# Patient Record
Sex: FEMALE | Race: OTHER | NOT HISPANIC OR LATINO | Employment: FULL TIME | URBAN - METROPOLITAN AREA
[De-identification: names, ages, dates, MRNs, and addresses within clinical notes are randomized per-mention and may not be internally consistent; named-entity substitution may affect disease eponyms.]

---

## 2017-11-06 ENCOUNTER — TELEPHONE (OUTPATIENT)
Dept: CARDIOLOGY | Facility: CLINIC | Age: 22
End: 2017-11-06

## 2017-11-07 NOTE — TELEPHONE ENCOUNTER
Called pt and left a message. We can see her at 3:40p on 11/16/17. Told her to call back to let me know if she could do this or not.    Kena

## 2017-11-16 ENCOUNTER — HOSPITAL ENCOUNTER (OUTPATIENT)
Dept: GENERAL RADIOLOGY | Facility: HOSPITAL | Age: 22
Discharge: HOME | End: 2017-11-16
Attending: INTERNAL MEDICINE | Admitting: INTERNAL MEDICINE

## 2017-11-16 ENCOUNTER — OFFICE VISIT (OUTPATIENT)
Dept: CARDIOLOGY | Facility: CLINIC | Age: 22
End: 2017-11-16

## 2017-11-16 VITALS
DIASTOLIC BLOOD PRESSURE: 62 MMHG | BODY MASS INDEX: 22.97 KG/M2 | SYSTOLIC BLOOD PRESSURE: 128 MMHG | HEART RATE: 70 BPM | HEIGHT: 60 IN | WEIGHT: 117 LBS

## 2017-11-16 DIAGNOSIS — R07.2 PRECORDIAL PAIN: ICD-10-CM

## 2017-11-16 DIAGNOSIS — R00.2 HEART PALPITATIONS: Primary | ICD-10-CM

## 2017-11-16 DIAGNOSIS — R00.2 HEART PALPITATIONS: ICD-10-CM

## 2017-11-16 PROCEDURE — 99204 OFFICE O/P NEW MOD 45 MIN: CPT | Performed by: INTERNAL MEDICINE

## 2017-11-16 PROCEDURE — 93000 ELECTROCARDIOGRAM COMPLETE: CPT | Performed by: INTERNAL MEDICINE

## 2017-11-16 PROCEDURE — 71020 HC CHEST PA AND LATERAL: CPT

## 2017-11-16 RX ORDER — LEVONORGESTREL / ETHINYL ESTRADIOL AND ETHINYL ESTRADIOL 150-30(84)
1 KIT ORAL DAILY
Refills: 1 | COMMUNITY
Start: 2017-09-21 | End: 2019-05-13

## 2017-11-16 NOTE — PROGRESS NOTES
Micaela Ariza  1995  Date of Office Visit: 11/16/2017  Encounter Provider: Darian Walton MD  Place of Service: [facility]      CHIEF COMPLAINT:  Chest pain  Palpitations    HISTORY OF PRESENT ILLNESS:I head the pleasure of seeing the patient in consultation today. As you well know, she is a very pleasant 22-year-old female with no significant cardiac history. She presents to me secondary to palpitations along with chest discomfort. She states that she occasionally will get chest discomfort that is typically left-sided. It varies from sharp to dull. It is mild to moderate in intensity. It typically lasts less than 10 minutes in duration. This is nonexertional. It tends to increase with deep inspiration.    She also states that she has been noted to have an abnormal heart beat by her mother. She states that she will have palpitations that are short in duration but occasionally typically once or twice a week will have episodes lasting about 10 minutes in duration and going away on their own. She denies any high-risk features including chest pain with those palpitations or syncope.        Review of Systems   Constitution: Negative for fever, weakness and malaise/fatigue.   HENT: Negative for nosebleeds and sore throat.    Eyes: Negative for blurred vision and double vision.   Cardiovascular: Positive for chest pain and palpitations. Negative for syncope.   Respiratory: Negative for cough, shortness of breath and snoring.    Endocrine: Negative for cold intolerance, heat intolerance and polydipsia.   Skin: Negative for itching, poor wound healing and rash.   Musculoskeletal: Negative for joint pain, joint swelling, muscle weakness and myalgias.   Gastrointestinal: Negative for abdominal pain, melena, nausea and vomiting.   Neurological: Negative for light-headedness, loss of balance, seizures and vertigo.   Psychiatric/Behavioral: Negative for altered mental status  "and depression.       Past Medical History:   Diagnosis Date   • Allergic rhinitis    • Helicobacter pylori ab+    • Herpes genitalis        The following portions of the patient's history were reviewed and updated as appropriate: Social history , Family history and Surgical history     No current outpatient prescriptions on file prior to visit.     No current facility-administered medications on file prior to visit.        No Known Allergies    Vitals:    11/16/17 1614   BP: 128/62   Pulse: 70   Weight: 117 lb (53.1 kg)   Height: 60\" (152.4 cm)     Physical Exam   Constitutional: She is oriented to person, place, and time. She appears well-developed and well-nourished.   HENT:   Head: Normocephalic and atraumatic.   Eyes: Conjunctivae and EOM are normal. No scleral icterus.   Neck: Normal range of motion. Neck supple. Normal carotid pulses, no hepatojugular reflux and no JVD present. Carotid bruit is not present. No tracheal deviation present. No thyromegaly present.   Cardiovascular: Normal rate and regular rhythm.  Exam reveals no gallop and no friction rub.    No murmur heard.  Pulses:       Carotid pulses are 2+ on the right side, and 2+ on the left side.       Radial pulses are 2+ on the right side, and 2+ on the left side.        Femoral pulses are 2+ on the right side, and 2+ on the left side.       Dorsalis pedis pulses are 2+ on the right side, and 2+ on the left side.        Posterior tibial pulses are 2+ on the right side, and 2+ on the left side.   Pulmonary/Chest: Breath sounds normal. No respiratory distress. She has no decreased breath sounds. She has no wheezes. She has no rhonchi. She has no rales. She exhibits no tenderness.   Abdominal: Soft. Bowel sounds are normal. She exhibits no distension. There is no tenderness. There is no rebound.   Musculoskeletal: She exhibits no edema or deformity.   Neurological: She is alert and oriented to person, place, and time. She has normal strength. No sensory " deficit.   Skin: No rash noted. No erythema.   Psychiatric: She has a normal mood and affect. Her behavior is normal.     No components found for: CBC  No results found for: CMP  No components found for: LIPID  No results found for: BMP      ECG 12 Lead  Date/Time: 11/16/2017 4:30 PM  Performed by: LUCIA STONE  Authorized by: LUCIA STONE   Comparison: not compared with previous ECG   Rhythm: sinus rhythm  Rate: normal  ST Segments: ST segments normal  T Waves: T waves normal  QRS axis: normal  Clinical impression: normal ECG               DISCUSSION/SUMMARYA 22-year-old female with no significant cardiac history who presents to me for evaluation of chest discomfort and palpitations. Her palpitations seem to be low risk. They are not occurring daily, not associated with chest pain or syncope. She has no evidence of abnormalities on her EKG.     Her chest pain is atypical. It varies from sharp to dull and tends to increase only with inspiration.     1. Chest pain: Atypical. I will get a chest x-ray and an echo. I have encouraged her to take ibuprofen if she needs to for the chest pain as it has an antiinflammatory component that is better certainly than Tylenol. If her chest x-ray and echo are negative, I would not recommend additional evaluation of the chest pain from a cardiac standpoint.   2. Palpitations: Low risk. If they become more frequent or intense, she is to give me a call and I will place a monitor on her at that time.

## 2017-11-28 ENCOUNTER — HOSPITAL ENCOUNTER (OUTPATIENT)
Dept: CARDIOLOGY | Facility: HOSPITAL | Age: 22
Discharge: HOME | End: 2017-11-28
Attending: INTERNAL MEDICINE | Admitting: INTERNAL MEDICINE

## 2017-11-28 VITALS
WEIGHT: 117 LBS | DIASTOLIC BLOOD PRESSURE: 60 MMHG | HEIGHT: 60 IN | SYSTOLIC BLOOD PRESSURE: 102 MMHG | HEART RATE: 91 BPM | BODY MASS INDEX: 22.97 KG/M2

## 2017-11-28 DIAGNOSIS — R00.2 HEART PALPITATIONS: ICD-10-CM

## 2017-11-28 DIAGNOSIS — R07.2 PRECORDIAL PAIN: ICD-10-CM

## 2017-11-28 LAB
ASCENDING AORTA: 2.1 CM
BH CV ECHO MEAS - ACS: 1.7 CM
BH CV ECHO MEAS - AO MAX PG (FULL): 3.1 MMHG
BH CV ECHO MEAS - AO MAX PG: 8 MMHG
BH CV ECHO MEAS - AO MEAN PG (FULL): 1.5 MMHG
BH CV ECHO MEAS - AO MEAN PG: 4.1 MMHG
BH CV ECHO MEAS - AO ROOT AREA (BSA CORRECTED): 1.6
BH CV ECHO MEAS - AO ROOT AREA: 4.3 CM^2
BH CV ECHO MEAS - AO ROOT DIAM: 2.3 CM
BH CV ECHO MEAS - AO V2 MAX: 141.2 CM/SEC
BH CV ECHO MEAS - AO V2 MEAN: 91.4 CM/SEC
BH CV ECHO MEAS - AO V2 VTI: 27 CM
BH CV ECHO MEAS - AVA(I,A): 1.4 CM^2
BH CV ECHO MEAS - AVA(I,D): 1.4 CM^2
BH CV ECHO MEAS - AVA(V,A): 1.5 CM^2
BH CV ECHO MEAS - AVA(V,D): 1.5 CM^2
BH CV ECHO MEAS - BSA(HAYCOCK): 1.5 M^2
BH CV ECHO MEAS - BSA: 1.5 M^2
BH CV ECHO MEAS - BZI_BMI: 22.9 KILOGRAMS/M^2
BH CV ECHO MEAS - BZI_METRIC_HEIGHT: 152.4 CM
BH CV ECHO MEAS - BZI_METRIC_WEIGHT: 53.1 KG
BH CV ECHO MEAS - CONTRAST EF (2CH): 65.1 ML/M^2
BH CV ECHO MEAS - CONTRAST EF 4CH: 64.5 ML/M^2
BH CV ECHO MEAS - EDV(MOD-SP2): 83 ML
BH CV ECHO MEAS - EDV(MOD-SP4): 76 ML
BH CV ECHO MEAS - EDV(TEICH): 87.3 ML
BH CV ECHO MEAS - EF(CUBED): 74.2 %
BH CV ECHO MEAS - EF(MOD-SP2): 65.1 %
BH CV ECHO MEAS - EF(MOD-SP4): 64.5 %
BH CV ECHO MEAS - EF(TEICH): 66.3 %
BH CV ECHO MEAS - ESV(MOD-SP2): 29 ML
BH CV ECHO MEAS - ESV(MOD-SP4): 27 ML
BH CV ECHO MEAS - ESV(TEICH): 29.4 ML
BH CV ECHO MEAS - FS: 36.4 %
BH CV ECHO MEAS - IVS/LVPW: 1.1
BH CV ECHO MEAS - IVSD: 0.8 CM
BH CV ECHO MEAS - LAT PEAK E' VEL: 16 CM/SEC
BH CV ECHO MEAS - LV DIASTOLIC VOL/BSA (35-75): 51.1 ML/M^2
BH CV ECHO MEAS - LV MASS(C)D: 103 GRAMS
BH CV ECHO MEAS - LV MASS(C)DI: 69.3 GRAMS/M^2
BH CV ECHO MEAS - LV MAX PG: 4.9 MMHG
BH CV ECHO MEAS - LV MEAN PG: 2.6 MMHG
BH CV ECHO MEAS - LV SYSTOLIC VOL/BSA (12-30): 18.2 ML/M^2
BH CV ECHO MEAS - LV V1 MAX: 110.6 CM/SEC
BH CV ECHO MEAS - LV V1 MEAN: 76.8 CM/SEC
BH CV ECHO MEAS - LV V1 VTI: 19.5 CM
BH CV ECHO MEAS - LVIDD: 4.4 CM
BH CV ECHO MEAS - LVIDS: 2.8 CM
BH CV ECHO MEAS - LVLD AP2: 7.6 CM
BH CV ECHO MEAS - LVLD AP4: 7.7 CM
BH CV ECHO MEAS - LVLS AP2: 6.2 CM
BH CV ECHO MEAS - LVLS AP4: 6.5 CM
BH CV ECHO MEAS - LVOT AREA (M): 2 CM^2
BH CV ECHO MEAS - LVOT AREA: 2 CM^2
BH CV ECHO MEAS - LVOT DIAM: 1.6 CM
BH CV ECHO MEAS - LVPWD: 0.73 CM
BH CV ECHO MEAS - MED PEAK E' VEL: 12 CM/SEC
BH CV ECHO MEAS - MV A DUR: 0.07 SEC
BH CV ECHO MEAS - MV A MAX VEL: 78.6 CM/SEC
BH CV ECHO MEAS - MV DEC SLOPE: 743.9 CM/SEC^2
BH CV ECHO MEAS - MV DEC TIME: 0.13 SEC
BH CV ECHO MEAS - MV E MAX VEL: 103.8 CM/SEC
BH CV ECHO MEAS - MV E/A: 1.3
BH CV ECHO MEAS - MV MAX PG: 8.9 MMHG
BH CV ECHO MEAS - MV MEAN PG: 2.9 MMHG
BH CV ECHO MEAS - MV P1/2T MAX VEL: 104.3 CM/SEC
BH CV ECHO MEAS - MV P1/2T: 41.1 MSEC
BH CV ECHO MEAS - MV V2 MAX: 149.3 CM/SEC
BH CV ECHO MEAS - MV V2 MEAN: 77.9 CM/SEC
BH CV ECHO MEAS - MV V2 VTI: 29.1 CM
BH CV ECHO MEAS - MVA P1/2T LCG: 2.1 CM^2
BH CV ECHO MEAS - MVA(P1/2T): 5.4 CM^2
BH CV ECHO MEAS - MVA(VTI): 1.3 CM^2
BH CV ECHO MEAS - PA ACC TIME: 0.15 SEC
BH CV ECHO MEAS - PA MAX PG (FULL): 2.3 MMHG
BH CV ECHO MEAS - PA MAX PG: 3.3 MMHG
BH CV ECHO MEAS - PA PR(ACCEL): 9.3 MMHG
BH CV ECHO MEAS - PA V2 MAX: 90.9 CM/SEC
BH CV ECHO MEAS - PULM A REVS DUR: 0.08 SEC
BH CV ECHO MEAS - PULM A REVS VEL: 21.8 CM/SEC
BH CV ECHO MEAS - PULM DIAS VEL: 74.2 CM/SEC
BH CV ECHO MEAS - PULM S/D: 0.64
BH CV ECHO MEAS - PULM SYS VEL: 47.5 CM/SEC
BH CV ECHO MEAS - PVA(V,A): 0.8 CM^2
BH CV ECHO MEAS - PVA(V,D): 0.8 CM^2
BH CV ECHO MEAS - QP/QS: 0.41
BH CV ECHO MEAS - RAP SYSTOLE: 3 MMHG
BH CV ECHO MEAS - RV MAX PG: 1 MMHG
BH CV ECHO MEAS - RV MEAN PG: 0.55 MMHG
BH CV ECHO MEAS - RV V1 MAX: 50 CM/SEC
BH CV ECHO MEAS - RV V1 MEAN: 34.5 CM/SEC
BH CV ECHO MEAS - RV V1 VTI: 10.8 CM
BH CV ECHO MEAS - RVOT AREA: 1.5 CM^2
BH CV ECHO MEAS - RVOT DIAM: 1.4 CM
BH CV ECHO MEAS - RVSP: 19.4 MMHG
BH CV ECHO MEAS - SI(AO): 78.6 ML/M^2
BH CV ECHO MEAS - SI(CUBED): 42.3 ML/M^2
BH CV ECHO MEAS - SI(LVOT): 25.8 ML/M^2
BH CV ECHO MEAS - SI(MOD-SP2): 36.3 ML/M^2
BH CV ECHO MEAS - SI(MOD-SP4): 33 ML/M^2
BH CV ECHO MEAS - SI(TEICH): 39 ML/M^2
BH CV ECHO MEAS - SUP REN AO DIAM: 0.9 CM
BH CV ECHO MEAS - SV(AO): 116.9 ML
BH CV ECHO MEAS - SV(CUBED): 62.9 ML
BH CV ECHO MEAS - SV(LVOT): 38.3 ML
BH CV ECHO MEAS - SV(MOD-SP2): 54 ML
BH CV ECHO MEAS - SV(MOD-SP4): 49 ML
BH CV ECHO MEAS - SV(RVOT): 15.7 ML
BH CV ECHO MEAS - SV(TEICH): 57.9 ML
BH CV ECHO MEAS - TAPSE (>1.6): 1.9 CM2
BH CV ECHO MEAS - TR MAX VEL: 202.2 CM/SEC
BH CV XLRA - RV BASE: 2.2 CM
BH CV XLRA - TDI S': 14 CM/SEC
E/E' RATIO: 8
LEFT ATRIUM VOLUME INDEX: 17 ML/M2
SINUS: 2.2 CM
STJ: 2.1 CM

## 2017-11-28 PROCEDURE — 93306 TTE W/DOPPLER COMPLETE: CPT | Performed by: INTERNAL MEDICINE

## 2017-11-28 PROCEDURE — 93306 TTE W/DOPPLER COMPLETE: CPT

## 2019-04-16 ENCOUNTER — TELEPHONE (OUTPATIENT)
Dept: OBSTETRICS AND GYNECOLOGY | Age: 24
End: 2019-04-16

## 2019-04-16 NOTE — TELEPHONE ENCOUNTER
Pt requesting to est care as NOB LMP 3/11/19. First pregnancy, no problems or concerns.    INS: B/C B/S no longer [facility]     Requesting Dr Barton    Sending to [place] for Dr Barton to review.     (Pt aware Dr Barton is out & on call today)

## 2019-05-13 ENCOUNTER — INITIAL PRENATAL (OUTPATIENT)
Dept: OBSTETRICS AND GYNECOLOGY | Age: 24
End: 2019-05-13

## 2019-05-13 ENCOUNTER — PROCEDURE VISIT (OUTPATIENT)
Dept: OBSTETRICS AND GYNECOLOGY | Age: 24
End: 2019-05-13

## 2019-05-13 VITALS — BODY MASS INDEX: 22.65 KG/M2 | WEIGHT: 116 LBS | DIASTOLIC BLOOD PRESSURE: 70 MMHG | SYSTOLIC BLOOD PRESSURE: 132 MMHG

## 2019-05-13 DIAGNOSIS — Z34.91 NORMAL PREGNANCY IN FIRST TRIMESTER: Primary | ICD-10-CM

## 2019-05-13 DIAGNOSIS — Z12.4 SCREENING FOR MALIGNANT NEOPLASM OF CERVIX: ICD-10-CM

## 2019-05-13 DIAGNOSIS — O36.80X0 ENCOUNTER TO DETERMINE FETAL VIABILITY OF PREGNANCY, SINGLE OR UNSPECIFIED FETUS: Primary | ICD-10-CM

## 2019-05-13 PROBLEM — Z34.01 ENCOUNTER FOR SUPERVISION OF LOW-RISK FIRST PREGNANCY IN FIRST TRIMESTER: Status: ACTIVE | Noted: 2019-05-13

## 2019-05-13 PROCEDURE — 0501F PRENATAL FLOW SHEET: CPT | Performed by: OBSTETRICS & GYNECOLOGY

## 2019-05-13 PROCEDURE — 76817 TRANSVAGINAL US OBSTETRIC: CPT | Performed by: OBSTETRICS & GYNECOLOGY

## 2019-05-13 RX ORDER — PRENATAL VIT/IRON FUM/FOLIC AC 27MG-0.8MG
TABLET ORAL 3 TIMES WEEKLY
COMMUNITY
End: 2022-06-28

## 2019-05-13 RX ORDER — DOXYLAMINE SUCCINATE AND PYRIDOXINE HYDROCHLORIDE, DELAYED RELEASE TABLETS 10 MG/10 MG 10; 10 MG/1; MG/1
2 TABLET, DELAYED RELEASE ORAL NIGHTLY PRN
Qty: 60 TABLET | Refills: 3 | Status: SHIPPED | OUTPATIENT
Start: 2019-05-13 | End: 2019-10-07

## 2019-05-13 RX ORDER — DOCUSATE SODIUM 100 MG/1
100 CAPSULE, LIQUID FILLED ORAL 2 TIMES DAILY
Qty: 60 CAPSULE | Refills: 6 | Status: SHIPPED | OUTPATIENT
Start: 2019-05-13 | End: 2022-06-28

## 2019-05-13 NOTE — PROGRESS NOTES
See scanned ultrasound report.         Ultrasound Note     2019    Patient:  Micaela Ariza      MR#:[id number]    24 y.o.   for dating and viability     Patient Active Problem List   Diagnosis   • Precordial pain   • Heart palpitations   • Encounter for supervision of low-risk first pregnancy in first trimester       [See the scanned report in the media tab for more detail]    Impression    1.  Intrauterine pregnancy at 8w0d, not consistent with LMP (9w0d)  2.  Viable fetus,   3.  EDC: Estimated Date of Delivery: 19  4.  Left ovary appears normal and measures 2.02 x 2.08 x 2.71 cm, the right ovary also appears normal and measures 1.84 x 1.15 x 0.95 cm    Relevant comparison data available:  [x] none      Becki Barton MD  2019  10:53 AM

## 2019-05-14 NOTE — PROGRESS NOTES
Chief Complaint   Patient presents with   • Initial Prenatal Visit     pregnancy confirmation LMP 3/11/19. last pap 2019       HPI:  Pt presents for initial OB visit.  This is her first pregnancy.  So far she has had some persistent nausea that has prevented her from eating a lot, but she denies any vomiting.  She is very active person, she works out frequently and eats a healthy diet.  She has become a little bit less restrictive in her diet since getting pregnant, but she does really cannot tolerate eating any meat.  She does desire genetic testing, desires the gender in an envelope    ROS:  No fever or chills, no vomiting no contractions, no leg pain, no LE edema, no leaking fluid, no bleeding, no headache, no dysuria  All other systems reviewed and negative    Exam:  See flow sheet  General:  Alert and oriented and no distress  Psych: normal affect, full range of emotion  HEENT: has significant underbite, has braces  Neck: no lymphadenopathy or thyromegaly  Lungs: CTAB, no wheezes or crackles  Heart: RRR, no M/R/G  Abd: nontender, nondistended, not apparently gravid  Ext: see flow sheet, non-tender bilateral , no lesions  Neuro: grossly normal, normal gait  Pelvic exam with normal appearing cervix, pap collected, cervix is closed      Assessment:/ PLAN:  24 y.o.  at 8w1d    Problems Addressed this Visit     None      Visit Diagnoses     Normal pregnancy in first trimester    -  Primary    Relevant Orders    Hgb. Frac. Profile    OB Panel With HIV    Urine Culture - Urine, Urine, Clean Catch    Varicella Zoster Antibody, IgG    Drug Profile Urine - 9 Drugs - Urine, Clean Catch    Screening for malignant neoplasm of cervix        Relevant Orders    IGP, Rfx Aptima HPV ASCU        Reviewed routine prenatal care with the office to include but not limited to expected weight gain during pregnancy, Tylenol products are fine, avoid aspirin and ibuprofen; Zika (travel restrictions/ok to use insect repellant); not  to change cat litter; food restrictions; avoidance of alcohol, tobacco, drugs and saunas/hot tubs.     Reviewed medical options for treatment of her nausea, also homeopathic medications.  She desires to try diclegis and sent to pharmacy today    Constipation- colace to pharmacy today and hydration    Prenatal labs and pap today    Ultrasound with viable fetus today    Reviewed genetic screening and she desires, wants the gender in an evelope    Pregnancy Risk:  NORMAL       Follow up in 4 WEEKS, 1-2 weeks genetics only    Becki Barton MD  05/13/2019  2:10 PM

## 2019-05-15 LAB
ABO GROUP BLD: (no result)
AMPHETAMINES UR QL SCN: NEGATIVE NG/ML
BACTERIA UR CULT: NO GROWTH
BACTERIA UR CULT: NORMAL
BARBITURATES UR QL SCN: NEGATIVE NG/ML
BASOPHILS # BLD AUTO: 0 X10E3/UL (ref 0–0.2)
BASOPHILS NFR BLD AUTO: 0 %
BENZODIAZ UR QL: NEGATIVE NG/ML
BLD GP AB SCN SERPL QL: NEGATIVE
BZE UR QL: NEGATIVE NG/ML
CANNABINOIDS UR QL SCN: NEGATIVE NG/ML
EOSINOPHIL # BLD AUTO: 0.1 X10E3/UL (ref 0–0.4)
EOSINOPHIL NFR BLD AUTO: 1 %
ERYTHROCYTE [DISTWIDTH] IN BLOOD BY AUTOMATED COUNT: 13.4 % (ref 12.3–15.4)
HBV SURFACE AG SERPL QL IA: NEGATIVE
HCT VFR BLD AUTO: 40.7 % (ref 34–46.6)
HCV AB S/CO SERPL IA: <0.1 S/CO RATIO (ref 0–0.9)
HGB A MFR BLD: 97.5 % (ref 96.4–98.8)
HGB A2 MFR BLD COLUMN CHROM: 2.5 % (ref 1.8–3.2)
HGB BLD-MCNC: 14 G/DL (ref 11.1–15.9)
HGB C MFR BLD: 0 %
HGB F MFR BLD: 0 % (ref 0–2)
HGB FRACT BLD-IMP: NORMAL
HGB S BLD QL SOLY: NEGATIVE
HGB S MFR BLD: 0 %
HIV 1+2 AB+HIV1 P24 AG SERPL QL IA: NON REACTIVE
IMM GRANULOCYTES # BLD AUTO: 0 X10E3/UL (ref 0–0.1)
IMM GRANULOCYTES NFR BLD AUTO: 0 %
LYMPHOCYTES # BLD AUTO: 1.7 X10E3/UL (ref 0.7–3.1)
LYMPHOCYTES NFR BLD AUTO: 19 %
MCH RBC QN AUTO: 30.6 PG (ref 26.6–33)
MCHC RBC AUTO-ENTMCNC: 34.4 G/DL (ref 31.5–35.7)
MCV RBC AUTO: 89 FL (ref 79–97)
METHADONE UR QL SCN: NEGATIVE NG/ML
MONOCYTES # BLD AUTO: 0.6 X10E3/UL (ref 0.1–0.9)
MONOCYTES NFR BLD AUTO: 6 %
NEUTROPHILS # BLD AUTO: 6.8 X10E3/UL (ref 1.4–7)
NEUTROPHILS NFR BLD AUTO: 74 %
OPIATES UR QL: NEGATIVE NG/ML
PCP UR QL: NEGATIVE NG/ML
PLATELET # BLD AUTO: 256 X10E3/UL (ref 150–379)
PROPOXYPH UR QL SCN: NEGATIVE NG/ML
RBC # BLD AUTO: 4.57 X10E6/UL (ref 3.77–5.28)
RH BLD: POSITIVE
RPR SER QL: NON REACTIVE
RUBV IGG SERPL IA-ACNC: <0.9 INDEX
VZV IGG SER IA-ACNC: 259 INDEX
WBC # BLD AUTO: 9.3 X10E3/UL (ref 3.4–10.8)

## 2019-05-16 LAB
CONV .: NORMAL
CYTOLOGIST CVX/VAG CYTO: NORMAL
CYTOLOGY CVX/VAG DOC CYTO: NORMAL
CYTOLOGY CVX/VAG DOC THIN PREP: NORMAL
DX ICD CODE: NORMAL
HIV 1 & 2 AB SER-IMP: NORMAL
Lab: NORMAL
OTHER STN SPEC: NORMAL
STAT OF ADQ CVX/VAG CYTO-IMP: NORMAL

## 2019-05-17 ENCOUNTER — TELEPHONE (OUTPATIENT)
Dept: OBSTETRICS AND GYNECOLOGY | Age: 24
End: 2019-05-17

## 2019-05-17 NOTE — TELEPHONE ENCOUNTER
----- Message from Becki Barton MD sent at 5/17/2019 10:40 AM EDT -----  Please let her know that her prenatal labs are normal but is not immune to rubella and will need vaccination after deliery. blood type O+, negative for HIV, syphilis, Hepatitis B and C. Is immune to varicella. No anemia at this time.      ----- Message -----  From: Javid, Reflab Results In  Sent: 5/15/2019   3:36 AM  To: Becki Barton MD

## 2019-06-06 ENCOUNTER — TELEPHONE (OUTPATIENT)
Dept: OBSTETRICS AND GYNECOLOGY | Age: 24
End: 2019-06-06

## 2019-06-06 DIAGNOSIS — Z31.5 ENCOUNTER FOR PROCREATIVE GENETIC COUNSELING: Primary | ICD-10-CM

## 2019-06-06 NOTE — TELEPHONE ENCOUNTER
Dr Arboleda pt had panorama testing, please sign and advise, pt DOES NOT want to know gender, will come by today to  envelope      _______________    I called her back and reviewed that the trisomy 18, 13, 21 were low risk but they were unable to report a fetal gender.  This could mean a greater risk of a sex chromosome disorder.  We will plan on referring her to maternal-fetal medicine to discuss diagnostic testing.    Becki Barton MD  6/6/2019  3:59 PM

## 2019-06-07 ENCOUNTER — TELEPHONE (OUTPATIENT)
Dept: OBSTETRICS AND GYNECOLOGY | Age: 24
End: 2019-06-07

## 2019-06-07 NOTE — TELEPHONE ENCOUNTER
----- Message from Becki Barton MD sent at 6/7/2019  3:18 PM EDT -----  Please let her know that her carrier screening for cystic fibrosis, spinal muscular atrophy, duchennes muscular dystrophy, and fragile X was negative     ----- Message -----  From: Laure Shelton APRN  Sent: 6/7/2019   3:04 PM  To: Becki Barton MD        ----- Message -----  From: Juma Hua  Sent: 6/7/2019   9:22 AM  To: FOREIGN Padilla

## 2019-06-10 ENCOUNTER — ROUTINE PRENATAL (OUTPATIENT)
Dept: OBSTETRICS AND GYNECOLOGY | Age: 24
End: 2019-06-10

## 2019-06-10 VITALS — WEIGHT: 115 LBS | SYSTOLIC BLOOD PRESSURE: 122 MMHG | BODY MASS INDEX: 22.46 KG/M2 | DIASTOLIC BLOOD PRESSURE: 74 MMHG

## 2019-06-10 DIAGNOSIS — O28.5 ABNORMAL GENETIC TEST DURING PREGNANCY: ICD-10-CM

## 2019-06-10 DIAGNOSIS — Z3A.12 12 WEEKS GESTATION OF PREGNANCY: Primary | ICD-10-CM

## 2019-06-10 DIAGNOSIS — Z34.01 ENCOUNTER FOR SUPERVISION OF LOW-RISK FIRST PREGNANCY IN FIRST TRIMESTER: ICD-10-CM

## 2019-06-10 DIAGNOSIS — K59.01 SLOW TRANSIT CONSTIPATION: ICD-10-CM

## 2019-06-10 PROBLEM — R00.2 HEART PALPITATIONS: Status: RESOLVED | Noted: 2017-11-16 | Resolved: 2019-06-10

## 2019-06-10 PROBLEM — R07.2 PRECORDIAL PAIN: Status: RESOLVED | Noted: 2017-11-16 | Resolved: 2019-06-10

## 2019-06-10 PROCEDURE — 0502F SUBSEQUENT PRENATAL CARE: CPT | Performed by: OBSTETRICS & GYNECOLOGY

## 2019-06-10 RX ORDER — AZITHROMYCIN 250 MG/1
TABLET, FILM COATED ORAL
Qty: 6 TABLET | Refills: 0 | Status: SHIPPED | OUTPATIENT
Start: 2019-06-10 | End: 2019-06-15

## 2019-06-10 NOTE — PROGRESS NOTES
Chief Complaint   Patient presents with   • Routine Prenatal Visit       HPI:  Pt presents for routine prenatal visit.  She is aware of the issue of the no call on the fetal gender.  She has an appointment on Thursday for consultation with [facility].  She is unsure if she would like diagnostic testing.  She has had ongoing issues with constipation.  This is been present prior to her pregnancy.    She has had an upper respiratory infection since our last visit 4 weeks ago.  She has an ongoing, nagging cough that she just cannot get rid of    ROS:  No fever or chills, no nausea or vomiting, no contractions, no leg pain, no LE edema, no leaking fluid, no bleeding, no headache, no dysuria  All other systems reviewed and negative    Exam:  See flow sheet  General:  Alert and oriented and no distress  Neck: no lymphadenopathy or thyromegaly  Lungs: non - labored breathing  Abd:  See flow sheet, fundus nontender  Ext: see flow sheet, non-tender bilateral , no lesions  Neuro: grossly normal    Assessment:/ PLAN:  24 y.o.  at 12w0d    Problems Addressed this Visit        Other    Encounter for supervision of low-risk first pregnancy in first trimester    Abnormal genetic test during pregnancy     No call on gender nunu blackwood, to see [facility] and have phone consultation with Alok           Other Visit Diagnoses     12 weeks gestation of pregnancy    -  Primary        Discussed that her pregnancy may be at risk of a sex chromosome abnormality due to a no call on only the sex chromosome.  Plan to refer to [facility] for further consultation and discussion of diagnostic testing.      Reviewed safe over-the-counter medications to take for her URI symptoms, also sent in azithromycin today as she has had a prolonged course of her URI and concern for possible bacterial superinfection    Pregnancy Risk:  COMPLICATED     Return in about 4 weeks (around 2019) for OB follow up.    Becki Barton MD  06/10/2019  9:26 AM

## 2019-06-13 ENCOUNTER — TRANSCRIBE ORDERS (OUTPATIENT)
Dept: ADMINISTRATIVE | Facility: HOSPITAL | Age: 24
End: 2019-06-13

## 2019-06-13 ENCOUNTER — HOSPITAL ENCOUNTER (OUTPATIENT)
Dept: ULTRASOUND IMAGING | Facility: HOSPITAL | Age: 24
Discharge: HOME | End: 2019-06-13
Admitting: OBSTETRICS & GYNECOLOGY

## 2019-06-13 ENCOUNTER — HOSPITAL ENCOUNTER (OUTPATIENT)
Dept: ULTRASOUND IMAGING | Facility: HOSPITAL | Age: 24
End: 2019-06-13

## 2019-06-13 DIAGNOSIS — Z31.5 ENCOUNTER FOR PROCREATIVE GENETIC COUNSELING: Primary | ICD-10-CM

## 2019-06-13 DIAGNOSIS — Z31.5 ENCOUNTER FOR PROCREATIVE GENETIC COUNSELING: ICD-10-CM

## 2019-06-13 PROCEDURE — 76801 OB US < 14 WKS SINGLE FETUS: CPT

## 2019-07-15 ENCOUNTER — ROUTINE PRENATAL (OUTPATIENT)
Dept: OBSTETRICS AND GYNECOLOGY | Age: 24
End: 2019-07-15

## 2019-07-15 VITALS — DIASTOLIC BLOOD PRESSURE: 70 MMHG | SYSTOLIC BLOOD PRESSURE: 112 MMHG | BODY MASS INDEX: 22.85 KG/M2 | WEIGHT: 117 LBS

## 2019-07-15 DIAGNOSIS — O28.5 ABNORMAL GENETIC TEST DURING PREGNANCY: Primary | ICD-10-CM

## 2019-07-15 DIAGNOSIS — K59.01 SLOW TRANSIT CONSTIPATION: ICD-10-CM

## 2019-07-15 DIAGNOSIS — N76.0 ACUTE VAGINITIS: ICD-10-CM

## 2019-07-15 DIAGNOSIS — Z34.01 ENCOUNTER FOR SUPERVISION OF LOW-RISK FIRST PREGNANCY IN FIRST TRIMESTER: ICD-10-CM

## 2019-07-15 PROCEDURE — 0502F SUBSEQUENT PRENATAL CARE: CPT | Performed by: OBSTETRICS & GYNECOLOGY

## 2019-07-15 RX ORDER — FLUCONAZOLE 150 MG/1
TABLET ORAL
Qty: 2 TABLET | Refills: 0 | Status: SHIPPED | OUTPATIENT
Start: 2019-07-15 | End: 2019-07-30

## 2019-07-15 NOTE — PROGRESS NOTES
Chief Complaint   Patient presents with   • Routine Prenatal Visit     pt here for routine ob check        HPI:  Pt presents for routine prenatal visit  Very constipated. Colace worked but stopped taking it  Vaginitis, present over the wkend. Tried monistat without relief, seemed to get worse.  Met with MFM, did sonogram and stated gender appeared male    ROS:  No fever or chills, no nausea or vomiting, no contractions, no leg pain, no LE edema, no leaking fluid, no bleeding, no headache, no dysuria  All other systems reviewed and negative    Exam:  See flow sheet  General:  Alert and oriented and no distress  Neck: no lymphadenopathy or thyromegaly  Lungs: non - labored breathing  Abd:  See flow sheet, fundus nontender  Ext: see flow sheet, non-tender bilateral , no lesions  Neuro: grossly normal    Assessment:/ PLAN:  24 y.o.  at 17w0d    Problems Addressed this Visit        Digestive    Slow transit constipation       Other    Encounter for supervision of low-risk first pregnancy in first trimester    Abnormal genetic test during pregnancy - Primary      Other Visit Diagnoses     Acute vaginitis        Relevant Orders    NuSwab Vaginitis (VG) - Swab, Vagina        Vaginitis- nu swab today. Also did not have GC/CT w initial OB  Gender no-call. Met w MFM and sonogram appears male. Discussed is still higher risk for sex chromosome abnormality. Panorama is not yet reporting risk of maternal sex chromosome abnormality.  AFP today  Constipation- recommmended consistent use of colace  Anatomy in two weeks    Pregnancy Risk:  NORMAL     Follow up in 2 weeks with anatomy    Becki Barton MD  07/15/2019  12:24 PM

## 2019-07-17 ENCOUNTER — TELEPHONE (OUTPATIENT)
Dept: OBSTETRICS AND GYNECOLOGY | Age: 24
End: 2019-07-17

## 2019-07-17 LAB
AFP ADJ MOM SERPL: 1.04
AFP INTERP SERPL-IMP: NORMAL
AFP INTERP SERPL-IMP: NORMAL
AFP SERPL-MCNC: 46 NG/ML
AGE AT DELIVERY: 26.6 YR
GA METHOD: NORMAL
GA: 17.1 WEEKS
IDDM PATIENT QL: NO
LABORATORY COMMENT REPORT: NORMAL
MULTIPLE PREGNANCY: NO
NEURAL TUBE DEFECT RISK FETUS: NORMAL %
RESULT: NORMAL

## 2019-07-17 NOTE — TELEPHONE ENCOUNTER
----- Message from Becki Barton MD sent at 7/17/2019 12:17 PM EDT -----  Please let her know that her AFP screen for neural tube defect was normal    ----- Message -----  From: Javid Reflab Results In  Sent: 7/17/2019   3:36 AM  To: Becki Barton MD

## 2019-07-18 ENCOUNTER — TELEPHONE (OUTPATIENT)
Dept: OBSTETRICS AND GYNECOLOGY | Age: 24
End: 2019-07-18

## 2019-07-18 LAB
A VAGINAE DNA VAG QL NAA+PROBE: ABNORMAL SCORE
BVAB2 DNA VAG QL NAA+PROBE: ABNORMAL SCORE
C ALBICANS DNA VAG QL NAA+PROBE: POSITIVE
C GLABRATA DNA VAG QL NAA+PROBE: NEGATIVE
MEGA1 DNA VAG QL NAA+PROBE: ABNORMAL SCORE
T VAGINALIS RRNA SPEC QL NAA+PROBE: NEGATIVE

## 2019-07-18 NOTE — TELEPHONE ENCOUNTER
Dr Barton pt called for her other lab results (vagina swab?) Informed pt I would send a message back to Norma since Norma called her.

## 2019-07-18 NOTE — TELEPHONE ENCOUNTER
I dont see it in my inbasket anymore, pretty sure I did sign off on it. I already gave her difucan at her visit

## 2019-07-30 ENCOUNTER — ROUTINE PRENATAL (OUTPATIENT)
Dept: OBSTETRICS AND GYNECOLOGY | Age: 24
End: 2019-07-30

## 2019-07-30 ENCOUNTER — PROCEDURE VISIT (OUTPATIENT)
Dept: OBSTETRICS AND GYNECOLOGY | Age: 24
End: 2019-07-30

## 2019-07-30 VITALS — DIASTOLIC BLOOD PRESSURE: 72 MMHG | BODY MASS INDEX: 23.24 KG/M2 | SYSTOLIC BLOOD PRESSURE: 124 MMHG | WEIGHT: 119 LBS

## 2019-07-30 DIAGNOSIS — Z36.89 ENCOUNTER FOR FETAL ANATOMIC SURVEY: Primary | ICD-10-CM

## 2019-07-30 DIAGNOSIS — Z36.86 ENCOUNTER FOR SCREENING FOR RISK OF PRE-TERM LABOR: ICD-10-CM

## 2019-07-30 DIAGNOSIS — Z34.01 ENCOUNTER FOR SUPERVISION OF LOW-RISK FIRST PREGNANCY IN FIRST TRIMESTER: ICD-10-CM

## 2019-07-30 DIAGNOSIS — K59.01 SLOW TRANSIT CONSTIPATION: Primary | ICD-10-CM

## 2019-07-30 PROCEDURE — 76817 TRANSVAGINAL US OBSTETRIC: CPT | Performed by: OBSTETRICS & GYNECOLOGY

## 2019-07-30 PROCEDURE — 76805 OB US >/= 14 WKS SNGL FETUS: CPT | Performed by: OBSTETRICS & GYNECOLOGY

## 2019-07-30 PROCEDURE — 0502F SUBSEQUENT PRENATAL CARE: CPT | Performed by: OBSTETRICS & GYNECOLOGY

## 2019-07-30 NOTE — PROGRESS NOTES
US completed NEREIDA Baldwin RN RDMS    Ultrasound Note     2019    Patient:  Micaela Ariza      MR#:[id number]    24 y.o.   for anatomic survey    Patient Active Problem List   Diagnosis   • Encounter for supervision of low-risk first pregnancy in first trimester   • Abnormal genetic test during pregnancy   • Slow transit constipation       [See the scanned report in the media tab for more details]    Impression    1.  Intrauterine pregnancy at 19w1d  2.  Normal and completed anatomic survey   3.  Fetus is male  4.  Cervical length 3.61 cm  5. Placenta is anterior, not low lying  6. Normal amniotic fluid    Relevant comparison data available:  [x] None      Becki Barton MD  2019 1:02 PM

## 2019-07-30 NOTE — PROGRESS NOTES
Chief Complaint   Patient presents with   • Routine Prenatal Visit     pt here for routine ob check and anatomy scan        HPI:  Pt presents for routine prenatal visit. Her bowel movements have been regular on the colace twice daily. Feels well. Just when she gets up  In the morning to pee has some pelvic pain.    ROS:  No fever or chills, no nausea or vomiting, no contractions, no leg pain, no LE edema, no leaking fluid, no bleeding, no headache, no dysuria  All other systems reviewed and negative    Exam:  See flow sheet  General:  Alert and oriented and no distress  Neck: no lymphadenopathy or thyromegaly  Lungs: non - labored breathing  Abd:  See flow sheet, fundus nontender  Ext: see flow sheet, non-tender bilateral , no lesions  Neuro: grossly normal    Assessment:/ PLAN:  24 y.o.  at 19w1d    Problems Addressed this Visit        Digestive    Slow transit constipation - Primary       Other    Encounter for supervision of low-risk first pregnancy in first trimester        Normal anatomy screen w cervical length today  Discussed cannot detect all possible problems with a fetus  Gender no call, male on anatomy    Constipation improved    Discussed contraception and desires monica, has had before.  Needs to pick out pediatrician    Pregnancy Risk:  NORMAL     Follow up in 4 wks    Becki Barton MD  2019  12:54 PM

## 2019-08-16 ENCOUNTER — TELEPHONE (OUTPATIENT)
Dept: OBSTETRICS AND GYNECOLOGY | Age: 24
End: 2019-08-16

## 2019-08-17 ENCOUNTER — HOSPITAL ENCOUNTER (OUTPATIENT)
Facility: HOSPITAL | Age: 24
Setting detail: OBSERVATION
Discharge: HOME | End: 2019-08-17
Attending: OBSTETRICS & GYNECOLOGY | Admitting: OBSTETRICS & GYNECOLOGY

## 2019-08-17 VITALS
RESPIRATION RATE: 18 BRPM | HEART RATE: 78 BPM | SYSTOLIC BLOOD PRESSURE: 134 MMHG | TEMPERATURE: 98.7 F | DIASTOLIC BLOOD PRESSURE: 77 MMHG

## 2019-08-17 PROBLEM — Z34.90 PREGNANCY: Status: ACTIVE | Noted: 2019-08-17

## 2019-08-17 PROCEDURE — G0378 HOSPITAL OBSERVATION PER HR: HCPCS

## 2019-08-17 NOTE — NURSING NOTE
Phone call report to Dr. Hazel. Pt presents with c/o decreased FM x 2-3 days. States she's been feeling fetal movement since 13 weeks. Continuous audible fetal movement noted. No contractions. MD reviewed chart while on phone and states last U/S report shows anterior placenta.

## 2019-08-27 ENCOUNTER — ROUTINE PRENATAL (OUTPATIENT)
Dept: OBSTETRICS AND GYNECOLOGY | Age: 24
End: 2019-08-27

## 2019-08-27 VITALS — WEIGHT: 126.2 LBS | SYSTOLIC BLOOD PRESSURE: 110 MMHG | DIASTOLIC BLOOD PRESSURE: 60 MMHG | BODY MASS INDEX: 24.65 KG/M2

## 2019-08-27 DIAGNOSIS — Z34.01 ENCOUNTER FOR SUPERVISION OF LOW-RISK FIRST PREGNANCY IN FIRST TRIMESTER: Primary | ICD-10-CM

## 2019-08-27 PROCEDURE — 0502F SUBSEQUENT PRENATAL CARE: CPT | Performed by: OBSTETRICS & GYNECOLOGY

## 2019-08-27 NOTE — PROGRESS NOTES
Chief Complaint   Patient presents with   • Routine Prenatal Visit     pt here for routine ob check        HPI:  Pt presents for routine prenatal visit. Went to the hospital w dec FM and was normal, does have ant placenta and is early. Feeling normal FM today.    ROS:  No fever or chills, no nausea or vomiting, no contractions, no leg pain, no LE edema, no leaking fluid, no bleeding, no headache, no dysuria  All other systems reviewed and negative    Exam:  See flow sheet  General:  Alert and oriented and no distress  Neck: no lymphadenopathy or thyromegaly  Lungs: non - labored breathing  Abd:  See flow sheet, fundus nontender  Ext: see flow sheet, non-tender bilateral , no lesions  Neuro: grossly normal    Assessment:/ PLAN:  24 y.o.  at 23w1d    Plan for GCT etc next visit  Constipation resolved  Reviewed fetal movement    Pregnancy Risk:  NORMAL     Follow up in 4 wks    Becki Barton MD  2019  2:56 PM

## 2019-09-23 ENCOUNTER — ROUTINE PRENATAL (OUTPATIENT)
Dept: OBSTETRICS AND GYNECOLOGY | Age: 24
End: 2019-09-23

## 2019-09-23 VITALS — WEIGHT: 127.4 LBS | DIASTOLIC BLOOD PRESSURE: 70 MMHG | SYSTOLIC BLOOD PRESSURE: 120 MMHG | BODY MASS INDEX: 24.88 KG/M2

## 2019-09-23 DIAGNOSIS — Z34.82 ENCOUNTER FOR SUPERVISION OF OTHER NORMAL PREGNANCY IN SECOND TRIMESTER: Primary | ICD-10-CM

## 2019-09-23 DIAGNOSIS — Z34.03 ENCOUNTER FOR SUPERVISION OF LOW-RISK FIRST PREGNANCY IN THIRD TRIMESTER: ICD-10-CM

## 2019-09-23 DIAGNOSIS — Z3A.27 27 WEEKS GESTATION OF PREGNANCY: ICD-10-CM

## 2019-09-23 PROCEDURE — 90471 IMMUNIZATION ADMIN: CPT | Performed by: OBSTETRICS & GYNECOLOGY

## 2019-09-23 PROCEDURE — 90715 TDAP VACCINE 7 YRS/> IM: CPT | Performed by: OBSTETRICS & GYNECOLOGY

## 2019-09-23 PROCEDURE — 0502F SUBSEQUENT PRENATAL CARE: CPT | Performed by: OBSTETRICS & GYNECOLOGY

## 2019-09-23 NOTE — PROGRESS NOTES
Chief Complaint   Patient presents with   • Routine Prenatal Visit     pt here for routine ob check, GTT, and Tdap vaccine        HPI:  Pt presents for routine prenatal visit. Feels like she is already running out of room. Concerned about weight gain, is not gaining much weight though she has been eating a non-low-carb diet.     ROS:  No fever or chills, no nausea or vomiting, no contractions, no leg pain, no LE edema, no leaking fluid, no bleeding, no headache, no dysuria  All other systems reviewed and negative    Exam:  See flow sheet  General:  Alert and oriented and no distress  Neck: no lymphadenopathy or thyromegaly  Lungs: non - labored breathing  Abd:  See flow sheet, fundus nontender  Ext: see flow sheet, non-tender bilateral , no lesions  Neuro: grossly normal    Assessment:/ PLAN:  24 y.o.  at 27w0d    Discussed her weight gain is normal so far at least 10 lbs and would expect approx 20 lb wt gain for her at end of pregnancy  Interested in 39 wk IOL due to social reasons, so that her mom can be present. Discussed will determine if appropriate closer to the time. Reviewed ARRIVE trial    GCT, CBC, HIV, RPR and Tdap today  Recommended close family members and caregivers have an adult booster of tdap  Breast pump order form completed today     Pregnancy Risk:  NORMAL     Follow up in 2 weeks    Becki Barton MD  2019  10:28 AM

## 2019-09-24 ENCOUNTER — TELEPHONE (OUTPATIENT)
Dept: OBSTETRICS AND GYNECOLOGY | Age: 24
End: 2019-09-24

## 2019-09-24 DIAGNOSIS — O99.810 ABNORMAL GLUCOSE TOLERANCE AFFECTING PREGNANCY, ANTEPARTUM: Primary | ICD-10-CM

## 2019-09-24 LAB
BASOPHILS # BLD AUTO: 0.03 10*3/MM3 (ref 0–0.2)
BASOPHILS NFR BLD AUTO: 0.3 % (ref 0–1.5)
EOSINOPHIL # BLD AUTO: 0.11 10*3/MM3 (ref 0–0.4)
EOSINOPHIL NFR BLD AUTO: 1.1 % (ref 0.3–6.2)
ERYTHROCYTE [DISTWIDTH] IN BLOOD BY AUTOMATED COUNT: 11.9 % (ref 12.3–15.4)
GLUCOSE 1H P 50 G GLC PO SERPL-MCNC: 136 MG/DL (ref 65–139)
HCT VFR BLD AUTO: 36 % (ref 34–46.6)
HGB BLD-MCNC: 12.1 G/DL (ref 12–15.9)
HIV 1+2 AB+HIV1 P24 AG SERPL QL IA: NON REACTIVE
IMM GRANULOCYTES # BLD AUTO: 0.05 10*3/MM3 (ref 0–0.05)
IMM GRANULOCYTES NFR BLD AUTO: 0.5 % (ref 0–0.5)
LYMPHOCYTES # BLD AUTO: 1.5 10*3/MM3 (ref 0.7–3.1)
LYMPHOCYTES NFR BLD AUTO: 14.7 % (ref 19.6–45.3)
MCH RBC QN AUTO: 30.9 PG (ref 26.6–33)
MCHC RBC AUTO-ENTMCNC: 33.6 G/DL (ref 31.5–35.7)
MCV RBC AUTO: 92.1 FL (ref 79–97)
MONOCYTES # BLD AUTO: 0.6 10*3/MM3 (ref 0.1–0.9)
MONOCYTES NFR BLD AUTO: 5.9 % (ref 5–12)
NEUTROPHILS # BLD AUTO: 7.94 10*3/MM3 (ref 1.7–7)
NEUTROPHILS NFR BLD AUTO: 77.5 % (ref 42.7–76)
NRBC BLD AUTO-RTO: 0 /100 WBC (ref 0–0.2)
PLATELET # BLD AUTO: 221 10*3/MM3 (ref 140–450)
RBC # BLD AUTO: 3.91 10*6/MM3 (ref 3.77–5.28)
RPR SER QL: NORMAL
WBC # BLD AUTO: 10.23 10*3/MM3 (ref 3.4–10.8)

## 2019-09-24 NOTE — TELEPHONE ENCOUNTER
Pt notified and understands. Will come in within the next few days to do the 3 hr GTT. She is aware that she needs to be fasting

## 2019-09-24 NOTE — TELEPHONE ENCOUNTER
----- Message from Becki Barton MD sent at 9/24/2019 12:56 PM EDT -----  Please let her know that her glucose test was a little bit elevated, I use a cutoff of 135 and her glucose challenge was 136.  Recommend doing the 3-hour test, needs to come in fasting for this. Ordered in future labs.    ----- Message -----  From: Interface, Reflab Results In  Sent: 9/24/2019   5:35 AM  To: Becki Barton MD

## 2019-09-27 ENCOUNTER — TELEPHONE (OUTPATIENT)
Dept: OBSTETRICS AND GYNECOLOGY | Age: 24
End: 2019-09-27

## 2019-09-27 LAB
GLUCOSE 1H P 100 G GLC PO SERPL-MCNC: 103 MG/DL (ref 65–179)
GLUCOSE 2H P 100 G GLC PO SERPL-MCNC: 115 MG/DL (ref 65–154)
GLUCOSE 3H P 100 G GLC PO SERPL-MCNC: 100 MG/DL (ref 65–139)
GLUCOSE P FAST SERPL-MCNC: 73 MG/DL (ref 65–94)

## 2019-09-27 NOTE — TELEPHONE ENCOUNTER
----- Message from Becki Barton MD sent at 9/27/2019  2:26 PM EDT -----  Please let her know that her three hour glucose test was normal.    ----- Message -----  From: Javid, Reflab Results In  Sent: 9/27/2019   3:36 AM  To: Becki Barton MD

## 2019-09-29 ENCOUNTER — RESULTS ENCOUNTER (OUTPATIENT)
Dept: OBSTETRICS AND GYNECOLOGY | Age: 24
End: 2019-09-29

## 2019-09-29 DIAGNOSIS — O99.810 ABNORMAL GLUCOSE TOLERANCE AFFECTING PREGNANCY, ANTEPARTUM: ICD-10-CM

## 2019-09-30 ENCOUNTER — TELEPHONE (OUTPATIENT)
Dept: OBSTETRICS AND GYNECOLOGY | Age: 24
End: 2019-09-30

## 2019-09-30 ENCOUNTER — ROUTINE PRENATAL (OUTPATIENT)
Dept: OBSTETRICS AND GYNECOLOGY | Age: 24
End: 2019-09-30

## 2019-09-30 VITALS — BODY MASS INDEX: 24.92 KG/M2 | DIASTOLIC BLOOD PRESSURE: 82 MMHG | WEIGHT: 127.6 LBS | SYSTOLIC BLOOD PRESSURE: 132 MMHG

## 2019-09-30 DIAGNOSIS — B37.31 VULVOVAGINAL CANDIDIASIS: ICD-10-CM

## 2019-09-30 DIAGNOSIS — Z34.03 ENCOUNTER FOR SUPERVISION OF LOW-RISK FIRST PREGNANCY IN THIRD TRIMESTER: ICD-10-CM

## 2019-09-30 DIAGNOSIS — Z13.89 SCREENING FOR BLOOD OR PROTEIN IN URINE: Primary | ICD-10-CM

## 2019-09-30 DIAGNOSIS — R82.998 LEUKOCYTES IN URINE: ICD-10-CM

## 2019-09-30 LAB
BILIRUB BLD-MCNC: NEGATIVE MG/DL
BILIRUB BLD-MCNC: NEGATIVE MG/DL
CLARITY, POC: ABNORMAL
CLARITY, POC: CLEAR
COLOR UR: YELLOW
COLOR UR: YELLOW
GLUCOSE UR STRIP-MCNC: NEGATIVE MG/DL
GLUCOSE UR STRIP-MCNC: NEGATIVE MG/DL
KETONES UR QL: NEGATIVE
KETONES UR QL: NEGATIVE
LEUKOCYTE EST, POC: ABNORMAL
LEUKOCYTE EST, POC: ABNORMAL
NITRITE UR-MCNC: NEGATIVE MG/ML
NITRITE UR-MCNC: NEGATIVE MG/ML
PH UR: 6.5 [PH] (ref 5–8)
PH UR: 7 [PH] (ref 5–8)
PROT UR STRIP-MCNC: NEGATIVE MG/DL
PROT UR STRIP-MCNC: NEGATIVE MG/DL
RBC # UR STRIP: ABNORMAL /UL
RBC # UR STRIP: NEGATIVE /UL
SP GR UR: 1.01 (ref 1–1.03)
SP GR UR: 1.01 (ref 1–1.03)
UROBILINOGEN UR QL: NORMAL
UROBILINOGEN UR QL: NORMAL

## 2019-09-30 PROCEDURE — 81002 URINALYSIS NONAUTO W/O SCOPE: CPT | Performed by: OBSTETRICS & GYNECOLOGY

## 2019-09-30 PROCEDURE — 0502F SUBSEQUENT PRENATAL CARE: CPT | Performed by: OBSTETRICS & GYNECOLOGY

## 2019-09-30 RX ORDER — FLUCONAZOLE 150 MG/1
150 TABLET ORAL ONCE
Qty: 2 TABLET | Refills: 0 | Status: SHIPPED | OUTPATIENT
Start: 2019-09-30 | End: 2019-09-30

## 2019-09-30 RX ORDER — NITROFURANTOIN 25; 75 MG/1; MG/1
100 CAPSULE ORAL 2 TIMES DAILY
Qty: 14 CAPSULE | Refills: 0 | Status: SHIPPED | OUTPATIENT
Start: 2019-09-30 | End: 2019-10-07

## 2019-09-30 NOTE — PROGRESS NOTES
"Chief Complaint   Patient presents with   • Pregnancy Problem     pt c/o yellow discharge. went to immediate care center over the weekend and was told her WBC count was high.        HPI:  Pt presents for routine prenatal visit. Friday morning and another morning this weekend had pelvic pain when woke up in AM. Also her discharge was an \"off\" color, more of a yellow color but has no itching, burning.   Seems like when she has had BV in the past   Has had unprotected intercourse with her baby's father, she is not with him and he has other partners    Diarrhea for two days, has some nausea but no vomiting. Has had a few people at work who have been sick.    ROS:  No fever or chills, no contractions, no leg pain, no LE edema, no leaking fluid, no bleeding, no headache, no dysuria  All other systems reviewed and negative    Exam:  See flow sheet  General:  Alert and oriented and no distress  Neck: no lymphadenopathy or thyromegaly  Lungs: non - labored breathing  Abd:  See flow sheet, fundus nontender  Ext: see flow sheet, non-tender bilateral , no lesions  Neuro: grossly normal    Pelvic exam with greenish tinged chunky white discharge    Wet mount + for hyphae but also some WBC    Assessment:/ PLAN:  24 y.o.  at 28w0d    Candidiasis today and diflucan to pharmacy  Also large leuks on UA and macrobid to pharmacy. Will send culture  Also concern for exposure, STI panel send today    Pregnancy Risk:  NORMAL     Follow up as scheduled    Becki Barton MD  2019  12:37 PM        "

## 2019-10-03 ENCOUNTER — TELEPHONE (OUTPATIENT)
Dept: OBSTETRICS AND GYNECOLOGY | Age: 24
End: 2019-10-03

## 2019-10-03 LAB
A VAGINAE DNA VAG QL NAA+PROBE: ABNORMAL SCORE
BVAB2 DNA VAG QL NAA+PROBE: ABNORMAL SCORE
C ALBICANS DNA VAG QL NAA+PROBE: POSITIVE
C GLABRATA DNA VAG QL NAA+PROBE: NEGATIVE
C TRACH RRNA SPEC QL NAA+PROBE: NEGATIVE
MEGA1 DNA VAG QL NAA+PROBE: ABNORMAL SCORE
N GONORRHOEA RRNA SPEC QL NAA+PROBE: NEGATIVE
T VAGINALIS RRNA SPEC QL NAA+PROBE: NEGATIVE

## 2019-10-03 NOTE — TELEPHONE ENCOUNTER
----- Message from Becki Barton MD sent at 10/3/2019 11:29 AM EDT -----  The swab showed yeast, as we suspected, no other infection. Hopefully she is feeling better    ----- Message -----  From: Norma Lal MA  Sent: 9/30/2019  12:03 PM  To: Becki Barton MD

## 2019-10-07 ENCOUNTER — ROUTINE PRENATAL (OUTPATIENT)
Dept: OBSTETRICS AND GYNECOLOGY | Age: 24
End: 2019-10-07

## 2019-10-07 VITALS — WEIGHT: 130.8 LBS | DIASTOLIC BLOOD PRESSURE: 70 MMHG | SYSTOLIC BLOOD PRESSURE: 128 MMHG | BODY MASS INDEX: 25.55 KG/M2

## 2019-10-07 DIAGNOSIS — Z23 NEED FOR INFLUENZA VACCINATION: Primary | ICD-10-CM

## 2019-10-07 PROCEDURE — 0502F SUBSEQUENT PRENATAL CARE: CPT | Performed by: OBSTETRICS & GYNECOLOGY

## 2019-10-07 PROCEDURE — 90471 IMMUNIZATION ADMIN: CPT | Performed by: OBSTETRICS & GYNECOLOGY

## 2019-10-07 PROCEDURE — 90674 CCIIV4 VAC NO PRSV 0.5 ML IM: CPT | Performed by: OBSTETRICS & GYNECOLOGY

## 2019-10-07 NOTE — PROGRESS NOTES
Chief Complaint   Patient presents with   • Routine Prenatal Visit     pt here for routine ob check

## 2019-10-22 ENCOUNTER — PROCEDURE VISIT (OUTPATIENT)
Dept: OBSTETRICS AND GYNECOLOGY | Age: 24
End: 2019-10-22

## 2019-10-22 ENCOUNTER — ROUTINE PRENATAL (OUTPATIENT)
Dept: OBSTETRICS AND GYNECOLOGY | Age: 24
End: 2019-10-22

## 2019-10-22 VITALS — SYSTOLIC BLOOD PRESSURE: 114 MMHG | DIASTOLIC BLOOD PRESSURE: 64 MMHG | BODY MASS INDEX: 26.37 KG/M2 | WEIGHT: 135 LBS

## 2019-10-22 DIAGNOSIS — O41.03X0 OLIGOHYDRAMNIOS IN THIRD TRIMESTER, SINGLE OR UNSPECIFIED FETUS: Primary | ICD-10-CM

## 2019-10-22 DIAGNOSIS — Z3A.31 31 WEEKS GESTATION OF PREGNANCY: Primary | ICD-10-CM

## 2019-10-22 DIAGNOSIS — B37.31 VULVOVAGINAL CANDIDIASIS: ICD-10-CM

## 2019-10-22 PROCEDURE — 0502F SUBSEQUENT PRENATAL CARE: CPT | Performed by: OBSTETRICS & GYNECOLOGY

## 2019-10-22 PROCEDURE — 76815 OB US LIMITED FETUS(S): CPT | Performed by: OBSTETRICS & GYNECOLOGY

## 2019-10-22 PROCEDURE — 87210 SMEAR WET MOUNT SALINE/INK: CPT | Performed by: OBSTETRICS & GYNECOLOGY

## 2019-10-22 NOTE — PROGRESS NOTES
Us done    Sonogram for NANCIE only  NANCIE 10.26 cm, normal, and DVP 10.26 cm  Vertex presentation and anterior placenta    Becki Bartno MD  10/22/2019  5:47 PM

## 2019-10-22 NOTE — PROGRESS NOTES
Chief Complaint   Patient presents with   • Routine Prenatal Visit     pt here for routine ob check      She complains that she wakes up at night often due to feeling of wetness, watery discharge. She denies any gushes of fluid or leaking during the day.  She notes some general uterine discomfort, worse with movement.  She denies any vaginal bleeding, does not feel as though she is having contractions.    She states she desires NCB, has not attended any classes or learned about coping mechanisms    /64   Wt 61.2 kg (135 lb)   LMP 2019   BMI 26.37 kg/m²   Appears well and is in no distress today  Regular, nonlabored breathing  Speculum exam with no pooling, no ferning, does have a slightly chunky discharge    Negative nitrazine, negative ferning  Wet mount with normal epithelial cells, no hyphae, no white blood cells    A/P  Exam negative for rupture today, discussed that she likely just has an increased physiologic discharge of pregnancy  Reviewed  labor precautions, cervix is closed today   S/p flu shot    Recommended she attend classes about natural child birth to learn about coping mechanisms    Fu 2 wks    Becki Barton MD  10/22/2019  4:06 PM

## 2019-11-04 ENCOUNTER — ROUTINE PRENATAL (OUTPATIENT)
Dept: OBSTETRICS AND GYNECOLOGY | Age: 24
End: 2019-11-04

## 2019-11-04 VITALS — BODY MASS INDEX: 26.87 KG/M2 | SYSTOLIC BLOOD PRESSURE: 120 MMHG | WEIGHT: 137.6 LBS | DIASTOLIC BLOOD PRESSURE: 70 MMHG

## 2019-11-04 DIAGNOSIS — B00.9 HERPES SIMPLEX VIRUS TYPE 2 (HSV-2) INFECTION AFFECTING PREGNANCY IN THIRD TRIMESTER: Primary | ICD-10-CM

## 2019-11-04 DIAGNOSIS — O98.513 HERPES SIMPLEX VIRUS TYPE 2 (HSV-2) INFECTION AFFECTING PREGNANCY IN THIRD TRIMESTER: Primary | ICD-10-CM

## 2019-11-04 PROBLEM — R76.8 HELICOBACTER PYLORI AB+: Status: ACTIVE | Noted: 2019-11-04

## 2019-11-04 PROCEDURE — 0502F SUBSEQUENT PRENATAL CARE: CPT | Performed by: OBSTETRICS & GYNECOLOGY

## 2019-11-04 RX ORDER — GUAIFENESIN 600 MG/1
600 TABLET, EXTENDED RELEASE ORAL 2 TIMES DAILY
Status: ON HOLD | COMMUNITY
End: 2019-12-11

## 2019-11-04 RX ORDER — VALACYCLOVIR HYDROCHLORIDE 500 MG/1
500 TABLET, FILM COATED ORAL 2 TIMES DAILY
Qty: 60 TABLET | Refills: 2 | Status: SHIPPED | OUTPATIENT
Start: 2019-11-04 | End: 2019-12-14 | Stop reason: HOSPADM

## 2019-11-04 NOTE — PROGRESS NOTES
Chief Complaint   Patient presents with   • Routine Prenatal Visit     33w0d       HPI:  Pt presents for routine prenatal visit. Has URI mostly w nasal congestion and watery eyes.    ROS:  No fever or chills, no nausea or vomiting, no contractions, no leg pain, no LE edema, no leaking fluid, no bleeding, no headache, no dysuria  All other systems reviewed and negative    Exam:  See flow sheet  General:  Alert and oriented and no distress  Neck: no lymphadenopathy or thyromegaly  Lungs: non - labored breathing  Abd:  See flow sheet, fundus nontender  Ext: see flow sheet, non-tender bilateral , no lesions  Neuro: grossly normal    Assessment:/ PLAN:  24 y.o.  at 33w0d    Reviewed medications safe to use in pregnancy for URI symptoms  S/p flu shot  Discussed finding a pediatrician today  Also discussed NCB  Has a hx of HSV, has not had outbreak in over a year. HSV suppression to pharmacy today, discussed can start now or at next visit.    Pregnancy Risk:  NORMAL     Follow up in 2 wks    Becki Barton MD  2019  11:05 AM

## 2019-11-18 ENCOUNTER — ROUTINE PRENATAL (OUTPATIENT)
Dept: OBSTETRICS AND GYNECOLOGY | Age: 24
End: 2019-11-18

## 2019-11-18 ENCOUNTER — PROCEDURE VISIT (OUTPATIENT)
Dept: OBSTETRICS AND GYNECOLOGY | Age: 24
End: 2019-11-18

## 2019-11-18 VITALS — WEIGHT: 139 LBS | BODY MASS INDEX: 27.15 KG/M2 | SYSTOLIC BLOOD PRESSURE: 130 MMHG | DIASTOLIC BLOOD PRESSURE: 80 MMHG

## 2019-11-18 DIAGNOSIS — B00.9 HERPES SIMPLEX VIRUS TYPE 2 (HSV-2) INFECTION AFFECTING PREGNANCY IN THIRD TRIMESTER: ICD-10-CM

## 2019-11-18 DIAGNOSIS — O36.5939 SGA (SMALL FOR GESTATIONAL AGE), FETAL, AFFECTING CARE OF MOTHER, ANTEPARTUM, THIRD TRIMESTER, OTHER FETUS: Primary | ICD-10-CM

## 2019-11-18 DIAGNOSIS — O26.849 FETAL SIZE INCONSISTENT WITH DATES: Primary | ICD-10-CM

## 2019-11-18 DIAGNOSIS — O98.513 HERPES SIMPLEX VIRUS TYPE 2 (HSV-2) INFECTION AFFECTING PREGNANCY IN THIRD TRIMESTER: ICD-10-CM

## 2019-11-18 PROBLEM — B37.31 VULVOVAGINAL CANDIDIASIS: Status: RESOLVED | Noted: 2019-09-30 | Resolved: 2019-11-18

## 2019-11-18 PROCEDURE — 0502F SUBSEQUENT PRENATAL CARE: CPT | Performed by: OBSTETRICS & GYNECOLOGY

## 2019-11-18 PROCEDURE — 76816 OB US FOLLOW-UP PER FETUS: CPT | Performed by: OBSTETRICS & GYNECOLOGY

## 2019-11-18 NOTE — PROGRESS NOTES
Chief Complaint   Patient presents with   • Routine Prenatal Visit     35w0d       HPI:  Pt presents for routine prenatal visit. She is doing well, no complaints, did start the acyclovir    ROS:  No fever or chills, no nausea or vomiting, no contractions, no leg pain, no LE edema, no leaking fluid, no bleeding, no headache, no dysuria  All other systems reviewed and negative    Exam:  See flow sheet  General:  Alert and oriented and no distress  Neck: no lymphadenopathy or thyromegaly  Lungs: non - labored breathing  Abd:  See flow sheet, fundus nontender  Ext: see flow sheet, non-tender bilateral , no lesions  Neuro: grossly normal    Assessment:/ PLAN:  24 y.o.  at 35w0d    FH small today, growth wnl however at 39% overall and AC 50%  Vertex  GBS next visit    Pregnancy Risk:  NORMAL     Follow up in 1-2 weeks    Becki Barton MD  2019  3:27 PM

## 2019-11-25 ENCOUNTER — ROUTINE PRENATAL (OUTPATIENT)
Dept: OBSTETRICS AND GYNECOLOGY | Age: 24
End: 2019-11-25

## 2019-11-25 VITALS — BODY MASS INDEX: 27.38 KG/M2 | SYSTOLIC BLOOD PRESSURE: 110 MMHG | WEIGHT: 140.2 LBS | DIASTOLIC BLOOD PRESSURE: 70 MMHG

## 2019-11-25 DIAGNOSIS — Z36.85 ANTENATAL SCREENING FOR STREPTOCOCCUS B: ICD-10-CM

## 2019-11-25 DIAGNOSIS — Z34.03 ENCOUNTER FOR SUPERVISION OF LOW-RISK FIRST PREGNANCY IN THIRD TRIMESTER: ICD-10-CM

## 2019-11-25 DIAGNOSIS — O98.513 HERPES SIMPLEX VIRUS TYPE 2 (HSV-2) INFECTION AFFECTING PREGNANCY IN THIRD TRIMESTER: Primary | ICD-10-CM

## 2019-11-25 DIAGNOSIS — B00.9 HERPES SIMPLEX VIRUS TYPE 2 (HSV-2) INFECTION AFFECTING PREGNANCY IN THIRD TRIMESTER: Primary | ICD-10-CM

## 2019-11-25 PROCEDURE — 0502F SUBSEQUENT PRENATAL CARE: CPT | Performed by: OBSTETRICS & GYNECOLOGY

## 2019-11-25 NOTE — PROGRESS NOTES
Chief Complaint   Patient presents with   • Routine Prenatal Visit     36w0d, GBS today      Fidelina more frequently but not regularly  Normal FM  No LOF    Attempted cervical exam but too uncomfortable  GBS today  vtx on BSUS    noraml growth last visit    FU 1 wk    Becki Barton MD  11/25/2019  2:58 PM

## 2019-11-29 LAB — B-HEM STREP SPEC QL CULT: NEGATIVE

## 2019-12-02 ENCOUNTER — ROUTINE PRENATAL (OUTPATIENT)
Dept: OBSTETRICS AND GYNECOLOGY | Age: 24
End: 2019-12-02

## 2019-12-02 ENCOUNTER — TELEPHONE (OUTPATIENT)
Dept: OBSTETRICS AND GYNECOLOGY | Age: 24
End: 2019-12-02

## 2019-12-02 VITALS — BODY MASS INDEX: 27.38 KG/M2 | SYSTOLIC BLOOD PRESSURE: 128 MMHG | WEIGHT: 140.2 LBS | DIASTOLIC BLOOD PRESSURE: 78 MMHG

## 2019-12-02 DIAGNOSIS — Z34.03 ENCOUNTER FOR SUPERVISION OF LOW-RISK FIRST PREGNANCY IN THIRD TRIMESTER: Primary | ICD-10-CM

## 2019-12-02 PROCEDURE — 0502F SUBSEQUENT PRENATAL CARE: CPT | Performed by: OBSTETRICS & GYNECOLOGY

## 2019-12-02 NOTE — TELEPHONE ENCOUNTER
----- Message from Becki Barton MD sent at 12/2/2019  9:44 AM EST -----  Please let her know that her GBS swab was negative.    ----- Message -----  From: Javid, Reflab Results In  Sent: 11/29/2019   9:36 AM  To: Becki Barton MD

## 2019-12-02 NOTE — PROGRESS NOTES
Chief Complaint   Patient presents with   • Routine Prenatal Visit     37w0d       HPI:  Pt presents for routine prenatal visit  Still desires IOL 39 wks for social reasons  Having irregular contractions, not suspicious for labor    ROS:  No fever or chills, no nausea or vomiting, no leg pain, no LE edema, no leaking fluid, no bleeding, no headache, no dysuria  All other systems reviewed and negative    Exam:  See flow sheet  General:  Alert and oriented and no distress  Neck: no lymphadenopathy or thyromegaly  Lungs: non - labored breathing  Abd:  See flow sheet, fundus nontender  Ext: see flow sheet, non-tender bilateral , no lesions  Neuro: grossly normal    Assessment:/ PLAN:  24 y.o.  at 37w0d    Reviewed IOL today  Reviewed signs of labor    Pregnancy Risk:  NORMAL       Follow up in 1 wk    Becki Barton MD  2019  2:31 PM

## 2019-12-09 ENCOUNTER — ROUTINE PRENATAL (OUTPATIENT)
Dept: OBSTETRICS AND GYNECOLOGY | Age: 24
End: 2019-12-09

## 2019-12-09 VITALS — WEIGHT: 140.4 LBS | DIASTOLIC BLOOD PRESSURE: 72 MMHG | SYSTOLIC BLOOD PRESSURE: 122 MMHG | BODY MASS INDEX: 27.42 KG/M2

## 2019-12-09 DIAGNOSIS — Z34.03 ENCOUNTER FOR SUPERVISION OF LOW-RISK FIRST PREGNANCY IN THIRD TRIMESTER: ICD-10-CM

## 2019-12-09 DIAGNOSIS — Z13.89 SCREENING FOR HEMATURIA OR PROTEINURIA: Primary | ICD-10-CM

## 2019-12-09 LAB
GLUCOSE UR STRIP-MCNC: NEGATIVE MG/DL
PROT UR STRIP-MCNC: NEGATIVE MG/DL

## 2019-12-09 PROCEDURE — 0502F SUBSEQUENT PRENATAL CARE: CPT | Performed by: OBSTETRICS & GYNECOLOGY

## 2019-12-09 PROCEDURE — 81002 URINALYSIS NONAUTO W/O SCOPE: CPT | Performed by: OBSTETRICS & GYNECOLOGY

## 2019-12-09 NOTE — PROGRESS NOTES
Chief Complaint   Patient presents with   • Routine Prenatal Visit     38w0d       HPI:  Pt presents for routine prenatal visit. She feels well, just tired of being pregnant. We had discussed induction of labor and she is still interested.    ROS:  No fever or chills, no nausea or vomiting, no contractions, no leg pain, no LE edema, no leaking fluid, no bleeding, no headache, no dysuria  All other systems reviewed and negative    Exam:  See flow sheet  General:  Alert and oriented and no distress  Neck: no lymphadenopathy or thyromegaly  Lungs: non - labored breathing  Abd:  See flow sheet, fundus nontender  Ext: see flow sheet, non-tender bilateral , no lesions  Neuro: grossly normal    Cervix 2/50/-2, posterior, firm    Assessment:/ PLAN:  24 y.o.  at 38w0d    Reviewed IOL 39 wks and she desires. Her mother is concerned she will not be able to take off work if not planned and she is her main support. Will plan for cervidil the evening prior though already 2 cm is posterior and somewhat firm.   GBS negative  kyleena for contraception    Pregnancy Risk:  NORMAL     Follow up for IOL next week    Becki Barton MD  2019  5:10 PM

## 2019-12-11 ENCOUNTER — ANESTHESIA (OUTPATIENT)
Dept: LABOR AND DELIVERY | Facility: HOSPITAL | Age: 24
End: 2019-12-11

## 2019-12-11 ENCOUNTER — ANESTHESIA EVENT (OUTPATIENT)
Dept: LABOR AND DELIVERY | Facility: HOSPITAL | Age: 24
End: 2019-12-11

## 2019-12-11 ENCOUNTER — HOSPITAL ENCOUNTER (INPATIENT)
Facility: HOSPITAL | Age: 24
LOS: 3 days | Discharge: HOME | End: 2019-12-14
Attending: OBSTETRICS & GYNECOLOGY | Admitting: OBSTETRICS & GYNECOLOGY

## 2019-12-11 ENCOUNTER — TELEPHONE (OUTPATIENT)
Dept: OBSTETRICS AND GYNECOLOGY | Age: 24
End: 2019-12-11

## 2019-12-11 PROBLEM — O36.8190 DECREASED FETAL MOVEMENT: Status: ACTIVE | Noted: 2019-12-11

## 2019-12-11 PROBLEM — O36.8190 DECREASED FETAL MOVEMENT DURING PREGNANCY, ANTEPARTUM: Status: ACTIVE | Noted: 2019-12-11

## 2019-12-11 LAB
ABO GROUP BLD: NORMAL
BLD GP AB SCN SERPL QL: NEGATIVE
DEPRECATED RDW RBC AUTO: 40.5 FL (ref 37–54)
ERYTHROCYTE [DISTWIDTH] IN BLOOD BY AUTOMATED COUNT: 12.4 % (ref 12.3–15.4)
EXPIRATION DATE: NORMAL
FETAL RBC/RBC BLD FC-RTO: 0 %
HCT VFR BLD AUTO: 33.4 % (ref 34–46.6)
HGB BLD-MCNC: 11.5 G/DL (ref 12–15.9)
HGB F BLD QL KLEIH BETKE: NORMAL
Lab: NORMAL
MCH RBC QN AUTO: 30.7 PG (ref 26.6–33)
MCHC RBC AUTO-ENTMCNC: 34.4 G/DL (ref 31.5–35.7)
MCV RBC AUTO: 89.1 FL (ref 79–97)
PLATELET # BLD AUTO: 219 10*3/MM3 (ref 140–450)
PMV BLD AUTO: 9.9 FL (ref 6–12)
PROT UR STRIP-MCNC: NEGATIVE MG/DL
RBC # BLD AUTO: 3.75 10*6/MM3 (ref 3.77–5.28)
RH BLD: POSITIVE
T&S EXPIRATION DATE: NORMAL
WBC NRBC COR # BLD: 9.78 10*3/MM3 (ref 3.4–10.8)

## 2019-12-11 PROCEDURE — 3E0P7VZ INTRODUCTION OF HORMONE INTO FEMALE REPRODUCTIVE, VIA NATURAL OR ARTIFICIAL OPENING: ICD-10-PCS | Performed by: OBSTETRICS & GYNECOLOGY

## 2019-12-11 PROCEDURE — 25010000002 BUTORPHANOL PER 1 MG: Performed by: OBSTETRICS & GYNECOLOGY

## 2019-12-11 PROCEDURE — 86901 BLOOD TYPING SEROLOGIC RH(D): CPT | Performed by: OBSTETRICS & GYNECOLOGY

## 2019-12-11 PROCEDURE — S0260 H&P FOR SURGERY: HCPCS | Performed by: OBSTETRICS & GYNECOLOGY

## 2019-12-11 PROCEDURE — 81002 URINALYSIS NONAUTO W/O SCOPE: CPT | Performed by: OBSTETRICS & GYNECOLOGY

## 2019-12-11 PROCEDURE — 85027 COMPLETE CBC AUTOMATED: CPT | Performed by: OBSTETRICS & GYNECOLOGY

## 2019-12-11 PROCEDURE — 86900 BLOOD TYPING SEROLOGIC ABO: CPT | Performed by: OBSTETRICS & GYNECOLOGY

## 2019-12-11 PROCEDURE — 85460 HEMOGLOBIN FETAL: CPT | Performed by: OBSTETRICS & GYNECOLOGY

## 2019-12-11 PROCEDURE — 59025 FETAL NON-STRESS TEST: CPT

## 2019-12-11 PROCEDURE — 86850 RBC ANTIBODY SCREEN: CPT | Performed by: OBSTETRICS & GYNECOLOGY

## 2019-12-11 RX ORDER — SODIUM CHLORIDE 0.9 % (FLUSH) 0.9 %
10 SYRINGE (ML) INJECTION AS NEEDED
Status: DISCONTINUED | OUTPATIENT
Start: 2019-12-11 | End: 2019-12-12

## 2019-12-11 RX ORDER — BUTORPHANOL TARTRATE 1 MG/ML
1 INJECTION, SOLUTION INTRAMUSCULAR; INTRAVENOUS
Status: DISCONTINUED | OUTPATIENT
Start: 2019-12-11 | End: 2019-12-12

## 2019-12-11 RX ORDER — DIPHENHYDRAMINE HYDROCHLORIDE 50 MG/ML
12.5 INJECTION INTRAMUSCULAR; INTRAVENOUS EVERY 8 HOURS PRN
Status: DISCONTINUED | OUTPATIENT
Start: 2019-12-11 | End: 2019-12-12

## 2019-12-11 RX ORDER — FAMOTIDINE 10 MG/ML
20 INJECTION, SOLUTION INTRAVENOUS ONCE AS NEEDED
Status: DISCONTINUED | OUTPATIENT
Start: 2019-12-11 | End: 2019-12-12

## 2019-12-11 RX ORDER — EPHEDRINE SULFATE 50 MG/ML
5 INJECTION, SOLUTION INTRAVENOUS AS NEEDED
Status: DISCONTINUED | OUTPATIENT
Start: 2019-12-11 | End: 2019-12-12

## 2019-12-11 RX ORDER — SODIUM CHLORIDE 0.9 % (FLUSH) 0.9 %
3 SYRINGE (ML) INJECTION EVERY 12 HOURS SCHEDULED
Status: DISCONTINUED | OUTPATIENT
Start: 2019-12-11 | End: 2019-12-12

## 2019-12-11 RX ORDER — ONDANSETRON 2 MG/ML
4 INJECTION INTRAMUSCULAR; INTRAVENOUS ONCE AS NEEDED
Status: COMPLETED | OUTPATIENT
Start: 2019-12-11 | End: 2019-12-12

## 2019-12-11 RX ADMIN — BUTORPHANOL TARTRATE 1 MG: 1 INJECTION, SOLUTION INTRAMUSCULAR; INTRAVENOUS at 23:51

## 2019-12-11 RX ADMIN — DINOPROSTONE 10 MG: 10 INSERT VAGINAL at 18:11

## 2019-12-11 RX ADMIN — SODIUM CHLORIDE, POTASSIUM CHLORIDE, SODIUM LACTATE AND CALCIUM CHLORIDE 1000 ML: 600; 310; 30; 20 INJECTION, SOLUTION INTRAVENOUS at 17:33

## 2019-12-11 NOTE — H&P
[facility]  Obstetric History and Physical    Chief Complaint   Patient presents with   • Contractions     pt tof l&d from home with reports of brownish mucus discharge since vaginal exam in office, pt reports contractions since then, pt also reports baby not as active since yesterday,    • Decreased Fetal Movement       Subjective     Patient is a 24 y.o. female  currently at 38w2d, who presents with brownish discharge since recent office exam and irregular contractions. She also notes less fetal movement in the last 2 days. She still feels fetal kicks but reports baby was previously very active. She denies active vaginal bleeding or leaking fluid. She denies current fever/chills. She denies any current HSV symptoms or lesions.     Her prenatal care is complicated by .  Patient Active Problem List   Diagnosis   • Encounter for supervision of low-risk first pregnancy in third trimester   • Abnormal genetic test during pregnancy   • Slow transit constipation   • Pregnancy   • Leukocytes in urine   • Helicobacter pylori ab+   • Herpes genitalis   • Herpes simplex virus type 2 (HSV-2) infection affecting pregnancy in third trimester   • Fetal size inconsistent with dates   • Decreased fetal movement during pregnancy, antepartum    Her previous obstetric/gynecological history is not contributory.    The following portions of the patients history were reviewed and updated as appropriate: current medications, allergies, past medical history, past surgical history, past family history, past social history and problem list .       Prenatal Information:  Prenatal Results     POC Urine Glucose/Protein     Test Value Reference Range Date Time    Urine Glucose Negative mg/dL Negative, 1000 mg/dL (3+) 19 1420    Urine Protein Negative mg/dL Negative 19 1339          Initial Prenatal Labs     Test Value Reference Range Date Time    Hemoglobin 14.0 g/dL 11.1 - 15.9 19 0951    Hematocrit 40.7 % 34.0 - 46.6  05/13/19 0951    Platelets 221 10*3/mm3 140 - 450 09/23/19       256 x10E3/uL 150 - 379 05/13/19 0951    Rubella IgG <0.90 index Immune >0.99 05/13/19 0951    Hepatitis B SAg Negative  Negative 05/13/19 0951    Hepatitis C Ab <0.1 s/co ratio 0.0 - 0.9 05/13/19 0951    RPR Comment  Non-Reactive 09/23/19       Non Reactive  Non Reactive 05/13/19 0951    ABO O   05/13/19 0951    Rh Positive   05/13/19 0951    Antibody Screen Negative  Negative 05/13/19 0951    HIV Non Reactive  Non Reactive 09/23/19       Non Reactive  Non Reactive 05/13/19 0951    Urine Culture Final report   05/13/19 0951    Gonorrhea Negative  Negative 09/30/19 1257    Chlamydia Negative  Negative 09/30/19 1257    TSH              2nd and 3rd Trimester     Test Value Reference Range Date Time    Hemoglobin (repeated) 12.1 g/dL 12.0 - 15.9 09/23/19     Hematocrit (repeated) 36.0 % 34.0 - 46.6 09/23/19      mg/dL 65 - 139 09/23/19     Antibody Screen (repeated)        GTT Fasting 73 mg/dL 65 - 94 09/26/19 0858    GTT 1 Hr 103 mg/dL 65 - 179 09/26/19 0858    GTT 2 Hr 115 mg/dL 65 - 154 09/26/19 0858    GTT 3 Hr 100 mg/dL 65 - 139 09/26/19 0858    Group B Strep Negative  Negative 11/25/19 1605          Drug Screening     Test Value Reference Range Date Time    Amphetamine Screen Negative ng/mL Fbwuyt=9535 05/13/19 0951    Barbiturate Screen Negative ng/mL Khdgnt=486 05/13/19 0951    Benzodiazepine Screen Negative ng/mL Vjnnlw=717 05/13/19 0951    Methadone Screen Negative ng/mL Iwakkv=475 05/13/19 0951    Phencyclidine Screen Negative ng/mL Cutoff=25 05/13/19 0951    Opiates Screen Negative ng/mL Wqhwkm=747 05/13/19 0951    THC Screen Negative ng/mL Cutoff=50 05/13/19 0951    Cocaine Screen Negative ng/mL Ktcxvh=860 05/13/19 0951    Propoxyphene Screen Negative ng/mL Sjedgj=222 05/13/19 0951    Buprenorphine Screen        Methamphetamine Screen        Oxycodone Screen        Tricyclic Antidepressants Screen              Other (Risk screening)      Test Value Reference Range Date Time    Varicella IgG 259 index Immune >165 05/13/19 0951    Parvovirus IgG        CMV IgG        Cystic Fibrosis        Hemoglobin electrophoresis        NIPT        MSAFP-4        AFP (for NTD only) *Screen Negative*   07/15/19 1258              External Prenatal Results     Pregnancy Outside Results - Transcribed From Office Records - See Scanned Records For Details     Test Value Date Time    Hgb 12.1 g/dL 09/23/19       14.0 g/dL 05/13/19 0951    Hct 36.0 % 09/23/19       40.7 % 05/13/19 0951    ABO O  05/13/19 0951    Rh Positive  05/13/19 0951    Antibody Screen Negative  05/13/19 0951    Glucose Fasting GTT 73 mg/dL 09/26/19 0858    Glucose Tolerance Test 1 hour 103 mg/dL 09/26/19 0858    Glucose Tolerance Test 3 hour 100 mg/dL 09/26/19 0858    Gonorrhea (discrete) Negative  09/30/19 1257    Chlamydia (discrete) Negative  09/30/19 1257    RPR Comment  09/23/19       Non Reactive  05/13/19 0951    VDRL       Syphilis Antibody       Rubella <0.90 index 05/13/19 0951    HBsAg Negative  05/13/19 0951    Herpes Simplex Virus PCR       Herpes Simplex VIrus Culture       HIV Non Reactive  09/23/19       Non Reactive  05/13/19 0951    Hep C RNA Quant PCR       Hep C Antibody <0.1 s/co ratio 05/13/19 0951    AFP 46.0 ng/mL 07/15/19 1258    Group B Strep Negative  11/25/19 1605    GBS Susceptibility to Clindamycin       GBS Susceptibility to Erythromycin       Fetal Fibronectin       Genetic Testing, Maternal Blood             Drug Screening     Test Value Date Time    Urine Drug Screen       Amphetamine Screen Negative ng/mL 05/13/19 0951    Barbiturate Screen Negative ng/mL 05/13/19 0951    Benzodiazepine Screen Negative ng/mL 05/13/19 0951    Methadone Screen Negative ng/mL 05/13/19 0951    Phencyclidine Screen Negative ng/mL 05/13/19 0951    Opiates Screen       THC Screen       Cocaine Screen       Propoxyphene Screen Negative ng/mL 05/13/19 0951    Buprenorphine Screen        Methamphetamine Screen       Oxycodone Screen       Tricyclic Antidepressants Screen                    Past OB History:     OB History    Para Term  AB Living   1 0 0 0 0 0   SAB TAB Ectopic Molar Multiple Live Births   0 0 0 0 0 0      # Outcome Date GA Lbr Rubio/2nd Weight Sex Delivery Anes PTL Lv   1 Current               Obstetric Comments   2019 8w0d Dating US:  Estimated Date of Delivery: 19 based on US kb   2019 19w1d normal and completed anatomic survey, male fetus, CL=3.61 cm  hb              Past Medical History: Past Medical History:   Diagnosis Date   • Abnormal Pap smear of cervix     2 yrs ago- no colpo.   • Allergic rhinitis    • Helicobacter pylori ab+    • Herpes genitalia     rare outbreak- 1 x/yr, 2018 last   • Herpes genitalis    • Malocclusion    • Ovarian cyst    • STD (female)    • Varicella       Past Surgical History Past Surgical History:   Procedure Laterality Date   • MOUTH SURGERY     • WISDOM TOOTH EXTRACTION        Family History: Family History   Problem Relation Age of Onset   • Hypertension Mother    • Hypertension Father    • Heart disease Paternal Grandmother    • Deep vein thrombosis Paternal Grandmother    • Breast cancer Neg Hx    • Ovarian cancer Neg Hx    • Uterine cancer Neg Hx    • Colon cancer Neg Hx    • Prostate cancer Neg Hx    • Melanoma Neg Hx    • Pulmonary embolism Neg Hx       Social History:  reports that she has never smoked. She has never used smokeless tobacco.   reports that she does not drink alcohol.   reports that she does not use drugs.        General ROS: .Review of Systems - General ROS: negative for - fever  Respiratory ROS: negative for cough or acute shortness of air  Cardiovascular ROS: negative for chest pain or increased swelling  Gastrointestinal ROS: negative for n/v  Genito-Urinary ROS: positive for brown discharge since cervical exam this week, positive for irregular contractions. negative for leakage of  fluid  Neurological ROS: positive for occasional headaches but not currently    Objective       Vital Signs Range for the last 24 hours  Temperature: Temp:  [98.6 °F (37 °C)] 98.6 °F (37 °C)   Temp Source:     BP: BP: (120-145)/(75-91) 120/79   Pulse: Heart Rate:  [87-98] 90   Respirations: Resp:  [18] 18   SPO2:     O2 Amount (l/min):     O2 Devices     Weight: Weight:  [63.5 kg (140 lb)] 63.5 kg (140 lb)     Physical Examination: General appearance - alert, well appearing, and in no distress  Mental status - alert, oriented to person, place, and time  Neck - supple, no significant adenopathy  Chest - normal respiratory effort  Heart - normal rate and regular rhythm  Abdomen - gravid, soft, nontender  Neurological - alert, oriented, normal speech, no focal findings or movement disorder noted  Perineum: no HSV lesions noted    Presentation: vtx   Cervix: Exam by: Method: sterile exam per physician   Dilation: Cervical Dilation (cm): 2-3   Effacement: Cervical Effacement: 70%   Station:         Fetal Heart Rate Assessment   Method:     Beats/min: Fetal HR (beats/min): 140   Baseline:     Variability: Fetal HR Variability: moderate (amplitude range 6 to 25 bpm)   Accels: Fetal HR Accelerations: greater than/equal to 15 bpm, lasting at least 15 seconds   Decels: Fetal HR Decelerations: absent   Tracing Category:       Uterine Assessment   Method:     Frequency (min): Contraction Frequency (Minutes): 2.5-5   Ctx Count in 10 min:     Duration:     Intensity: Contraction Intensity: mild by palpation   Intensity by IUPC:     Resting Tone: Uterine Resting Tone: soft by palpation   Resting Tone by IUPC:     [place] Units:         Assessment/Plan       Pregnancy    Decreased fetal movement during pregnancy, antepartum        Assessment:  1.  Intrauterine pregnancy at 38w2d gestation with reactive fetal status.    2.  decreased fetal movement : reactive fetal heart tracing noted. Recommend labor induction due to decreased  fetal movement at term. Reviewed risks and benefits including prematurity risks to infant and prolonged labor/increased bleeding/infection related to induction. Patient considered option of observation but ultimately desired labor induction due to overall decrease in movement she is noting. Patient requests cervidil induction as this was planned by her primary OB next week.  3.  Obstetrical history is not contributory  4.  GBS status:   Strep Gp B Culture   Date Value Ref Range Status   2019 Negative Negative Final     Comment:     Centers for Disease Control and Prevention (CDC) and American Congress  of Obstetricians and Gynecologists (ACOG) guidelines for prevention of   group B streptococcal (GBS) disease specify co-collection of  a vaginal and rectal swab specimen to maximize sensitivity of GBS  detection. Per the CDC and ACOG, swabbing both the lower vagina and  rectum substantially increases the yield of detection compared with  sampling the vagina alone.  Penicillin G, ampicillin, or cefazolin are indicated for intrapartum  prophylaxis of  GBS colonization. Reflex susceptibility  testing should be performed prior to use of clindamycin only on GBS  isolates from penicillin-allergic women who are considered a high risk  for anaphylaxis. Treatment with vancomycin without additional testing  is warranted if resistance to clindamycin is noted.         Plan:  1.  Cervidil induction of labor  2. Plan of care has been reviewed with patient   3.  Risks, benefits of treatment plan have been discussed.  4.  All questions have been answered.        Lolly Del Angel MD  2019  4:13 PM

## 2019-12-11 NOTE — TELEPHONE ENCOUNTER
(Mick lagos) Pt is 38 weeks and on Monday she got her cervix checked, yesterday she woke up and lost her mucus plug followed by some brown/red discharge that continued through the day and into today. Pt is having cramps from the top of her stomach radiating to the bottom along with some pressure in her lower abdomin. Pt denies any other symptoms.     [phone]

## 2019-12-11 NOTE — PLAN OF CARE
Pt presents with decreased FM and brown discharge to triage. Plan of care is to have pt stay for IOL.

## 2019-12-12 PROCEDURE — 25010000002 ONDANSETRON PER 1 MG: Performed by: ANESTHESIOLOGY

## 2019-12-12 PROCEDURE — 6A550ZT PHERESIS OF CORD BLOOD STEM CELLS, SINGLE: ICD-10-PCS | Performed by: OBSTETRICS & GYNECOLOGY

## 2019-12-12 PROCEDURE — 59400 OBSTETRICAL CARE: CPT | Performed by: OBSTETRICS & GYNECOLOGY

## 2019-12-12 PROCEDURE — C1755 CATHETER, INTRASPINAL: HCPCS

## 2019-12-12 PROCEDURE — C1755 CATHETER, INTRASPINAL: HCPCS | Performed by: STUDENT IN AN ORGANIZED HEALTH CARE EDUCATION/TRAINING PROGRAM

## 2019-12-12 RX ORDER — METHYLERGONOVINE MALEATE 0.2 MG/ML
200 INJECTION INTRAVENOUS ONCE AS NEEDED
Status: DISCONTINUED | OUTPATIENT
Start: 2019-12-12 | End: 2019-12-12

## 2019-12-12 RX ORDER — MISOPROSTOL 200 UG/1
800 TABLET ORAL AS NEEDED
Status: DISCONTINUED | OUTPATIENT
Start: 2019-12-12 | End: 2019-12-14 | Stop reason: HOSPADM

## 2019-12-12 RX ORDER — ONDANSETRON 4 MG/1
4 TABLET, FILM COATED ORAL EVERY 6 HOURS PRN
Status: DISCONTINUED | OUTPATIENT
Start: 2019-12-12 | End: 2019-12-14 | Stop reason: HOSPADM

## 2019-12-12 RX ORDER — OXYCODONE HYDROCHLORIDE AND ACETAMINOPHEN 5; 325 MG/1; MG/1
1 TABLET ORAL EVERY 4 HOURS PRN
Status: DISCONTINUED | OUTPATIENT
Start: 2019-12-12 | End: 2019-12-14 | Stop reason: HOSPADM

## 2019-12-12 RX ORDER — MORPHINE SULFATE 1 MG/ML
1 INJECTION, SOLUTION EPIDURAL; INTRATHECAL; INTRAVENOUS EVERY 4 HOURS PRN
Status: DISCONTINUED | OUTPATIENT
Start: 2019-12-12 | End: 2019-12-14 | Stop reason: HOSPADM

## 2019-12-12 RX ORDER — SODIUM CHLORIDE, SODIUM LACTATE, POTASSIUM CHLORIDE, CALCIUM CHLORIDE 600; 310; 30; 20 MG/100ML; MG/100ML; MG/100ML; MG/100ML
125 INJECTION, SOLUTION INTRAVENOUS CONTINUOUS
Status: DISCONTINUED | OUTPATIENT
Start: 2019-12-12 | End: 2019-12-12

## 2019-12-12 RX ORDER — SODIUM CHLORIDE 0.9 % (FLUSH) 0.9 %
1-10 SYRINGE (ML) INJECTION AS NEEDED
Status: DISCONTINUED | OUTPATIENT
Start: 2019-12-12 | End: 2019-12-14 | Stop reason: HOSPADM

## 2019-12-12 RX ORDER — NALOXONE HCL 0.4 MG/ML
0.4 VIAL (ML) INJECTION
Status: DISCONTINUED | OUTPATIENT
Start: 2019-12-12 | End: 2019-12-14 | Stop reason: HOSPADM

## 2019-12-12 RX ORDER — CARBOPROST TROMETHAMINE 250 UG/ML
250 INJECTION, SOLUTION INTRAMUSCULAR AS NEEDED
Status: DISCONTINUED | OUTPATIENT
Start: 2019-12-12 | End: 2019-12-12

## 2019-12-12 RX ORDER — ERYTHROMYCIN 5 MG/G
OINTMENT OPHTHALMIC
Status: DISPENSED
Start: 2019-12-12 | End: 2019-12-13

## 2019-12-12 RX ORDER — OXYTOCIN-SODIUM CHLORIDE 0.9% IV SOLN 30 UNIT/500ML 30-0.9/5 UT/ML-%
250 SOLUTION INTRAVENOUS CONTINUOUS
Status: ACTIVE | OUTPATIENT
Start: 2019-12-12 | End: 2019-12-12

## 2019-12-12 RX ORDER — OXYTOCIN-SODIUM CHLORIDE 0.9% IV SOLN 30 UNIT/500ML 30-0.9/5 UT/ML-%
2-30 SOLUTION INTRAVENOUS
Status: DISCONTINUED | OUTPATIENT
Start: 2019-12-12 | End: 2019-12-12

## 2019-12-12 RX ORDER — PROMETHAZINE HYDROCHLORIDE 25 MG/ML
12.5 INJECTION, SOLUTION INTRAMUSCULAR; INTRAVENOUS EVERY 4 HOURS PRN
Status: DISCONTINUED | OUTPATIENT
Start: 2019-12-12 | End: 2019-12-14 | Stop reason: HOSPADM

## 2019-12-12 RX ORDER — METOCLOPRAMIDE 10 MG/1
10 TABLET ORAL ONCE
Status: DISCONTINUED | OUTPATIENT
Start: 2019-12-12 | End: 2019-12-12

## 2019-12-12 RX ORDER — PROMETHAZINE HYDROCHLORIDE 12.5 MG/1
12.5 TABLET ORAL EVERY 4 HOURS PRN
Status: DISCONTINUED | OUTPATIENT
Start: 2019-12-12 | End: 2019-12-14 | Stop reason: HOSPADM

## 2019-12-12 RX ORDER — OXYTOCIN-SODIUM CHLORIDE 0.9% IV SOLN 30 UNIT/500ML 30-0.9/5 UT/ML-%
125 SOLUTION INTRAVENOUS CONTINUOUS PRN
Status: COMPLETED | OUTPATIENT
Start: 2019-12-12 | End: 2019-12-12

## 2019-12-12 RX ORDER — LANOLIN
CREAM (ML) TOPICAL AS NEEDED
Status: DISCONTINUED | OUTPATIENT
Start: 2019-12-12 | End: 2019-12-14 | Stop reason: HOSPADM

## 2019-12-12 RX ORDER — TEMAZEPAM 7.5 MG/1
7.5 CAPSULE ORAL NIGHTLY PRN
Status: DISCONTINUED | OUTPATIENT
Start: 2019-12-12 | End: 2019-12-14 | Stop reason: HOSPADM

## 2019-12-12 RX ORDER — MISOPROSTOL 200 UG/1
800 TABLET ORAL AS NEEDED
Status: DISCONTINUED | OUTPATIENT
Start: 2019-12-12 | End: 2019-12-12

## 2019-12-12 RX ORDER — OXYCODONE AND ACETAMINOPHEN 7.5; 325 MG/1; MG/1
1 TABLET ORAL EVERY 4 HOURS PRN
Status: DISCONTINUED | OUTPATIENT
Start: 2019-12-12 | End: 2019-12-14 | Stop reason: HOSPADM

## 2019-12-12 RX ORDER — OXYTOCIN-SODIUM CHLORIDE 0.9% IV SOLN 30 UNIT/500ML 30-0.9/5 UT/ML-%
999 SOLUTION INTRAVENOUS ONCE
Status: COMPLETED | OUTPATIENT
Start: 2019-12-12 | End: 2019-12-12

## 2019-12-12 RX ORDER — BISACODYL 10 MG
10 SUPPOSITORY, RECTAL RECTAL DAILY PRN
Status: DISCONTINUED | OUTPATIENT
Start: 2019-12-13 | End: 2019-12-14 | Stop reason: HOSPADM

## 2019-12-12 RX ORDER — PHYTONADIONE 1 MG/.5ML
INJECTION, EMULSION INTRAMUSCULAR; INTRAVENOUS; SUBCUTANEOUS
Status: DISPENSED
Start: 2019-12-12 | End: 2019-12-13

## 2019-12-12 RX ORDER — IBUPROFEN 800 MG/1
800 TABLET ORAL EVERY 8 HOURS SCHEDULED
Status: DISCONTINUED | OUTPATIENT
Start: 2019-12-12 | End: 2019-12-14 | Stop reason: HOSPADM

## 2019-12-12 RX ORDER — DOCUSATE SODIUM 100 MG/1
100 CAPSULE, LIQUID FILLED ORAL 2 TIMES DAILY PRN
Status: DISCONTINUED | OUTPATIENT
Start: 2019-12-12 | End: 2019-12-14 | Stop reason: HOSPADM

## 2019-12-12 RX ORDER — NALOXONE HCL 0.4 MG/ML
0.1 VIAL (ML) INJECTION
Status: DISCONTINUED | OUTPATIENT
Start: 2019-12-12 | End: 2019-12-14 | Stop reason: HOSPADM

## 2019-12-12 RX ORDER — ONDANSETRON 2 MG/ML
4 INJECTION INTRAMUSCULAR; INTRAVENOUS EVERY 6 HOURS PRN
Status: DISCONTINUED | OUTPATIENT
Start: 2019-12-12 | End: 2019-12-14 | Stop reason: HOSPADM

## 2019-12-12 RX ADMIN — OXYTOCIN 2 MILLI-UNITS/MIN: 10 INJECTION, SOLUTION INTRAMUSCULAR; INTRAVENOUS at 08:00

## 2019-12-12 RX ADMIN — OXYTOCIN 999 ML/HR: 10 INJECTION, SOLUTION INTRAMUSCULAR; INTRAVENOUS at 12:40

## 2019-12-12 RX ADMIN — SODIUM CHLORIDE, POTASSIUM CHLORIDE, SODIUM LACTATE AND CALCIUM CHLORIDE 125 ML/HR: 600; 310; 30; 20 INJECTION, SOLUTION INTRAVENOUS at 09:38

## 2019-12-12 RX ADMIN — IBUPROFEN 800 MG: 800 TABLET, FILM COATED ORAL at 20:39

## 2019-12-12 RX ADMIN — Medication 8 ML/HR: at 09:59

## 2019-12-12 RX ADMIN — OXYTOCIN 125 ML/HR: 10 INJECTION, SOLUTION INTRAMUSCULAR; INTRAVENOUS at 13:57

## 2019-12-12 RX ADMIN — Medication: at 16:29

## 2019-12-12 RX ADMIN — SODIUM CHLORIDE, POTASSIUM CHLORIDE, SODIUM LACTATE AND CALCIUM CHLORIDE 125 ML/HR: 600; 310; 30; 20 INJECTION, SOLUTION INTRAVENOUS at 01:13

## 2019-12-12 RX ADMIN — DOCUSATE SODIUM 100 MG: 100 CAPSULE, LIQUID FILLED ORAL at 20:40

## 2019-12-12 RX ADMIN — ONDANSETRON 4 MG: 2 INJECTION INTRAMUSCULAR; INTRAVENOUS at 10:38

## 2019-12-12 NOTE — ANESTHESIA PROCEDURE NOTES
Labor Epidural      Patient location during procedure: OB  Start Time: 12/12/2019 9:45 AM  Stop Time: 12/12/2019 9:58 AM  Indication:at surgeon's request  Performed By  Anesthesiologist: Uriel Avila MD  Preanesthetic Checklist  Completed: patient identified, site marked, surgical consent, pre-op evaluation, timeout performed, IV checked, risks and benefits discussed and monitors and equipment checked  Prep:  Pt Position:sitting  Sterile Tech:gloves, cap, sterile barrier and mask  Prep:chlorhexidine gluconate and isopropyl alcohol  Monitoring:continuous pulse oximetry, EKG and blood pressure monitoring  Epidural Block Procedure:  Approach:midline  Guidance:landmark technique  Location:L4-L5  Needle Type:Tuohy  Needle Gauge:19 G  Loss of Resistance Medium: saline  Loss of Resistance: 6cm  Cath Depth at skin:11 cm  Paresthesia: none  Aspiration:negative  Test Dose:negative  Number of Attempts: 1  Post Assessment:  Dressing:secured with tape and occlusive dressing applied  Pt Tolerance:patient tolerated the procedure well with no apparent complications  Complications:no

## 2019-12-12 NOTE — L&D DELIVERY NOTE
[facility]  Vaginal Delivery Note    Delivery     Delivery: Vaginal, Spontaneous    Active Hospital Problems    Diagnosis POA   • Decreased fetal movement during pregnancy, antepartum [O36.8190] Unknown   • Decreased fetal movement [O36.8190] Yes   • Pregnancy [Z34.90] Not Applicable      YOB: 2021    Time of Birth: 12:37 PM      Anesthesia: epidural    Delivering clinician: PIERO Robbins    Pre-op dg 38-week 3-day intrauterine pregnancy admitted yesterday for cervical ripening secondary to decreased fetal movement  Post-op same                  Delivery narrative: Patient was seen in labor and delivery yesterday at 38 weeks for decreased fetal movement.  She was subsequently started for cervical ripening.  She initially wanted to go natural but changed her mind around 7 8 cm received epidural anesthesia.  She made nice change to complete only had to push for short period time and started to crown the vertex.  De Paz catheter was removed.              Patient was prepped and draped. The bladder had been drained.  The patient pushed for a spontaneous vaginal delivery, as the vertex was gently guided over the perineum. The perineum was supported during the delivery process. Bulb suction was used for the mouth and oropharynx, the remainder of the infant was delivered without difficulty.  The infant was vigorous, moving all four extremities, and was  handed off to the waiting staff. The placenta followed  Spontaneously, it  was intact and with a 3 vessel cord.Uterus was massaged and firmed up appropriately.  Pitocin was given in the IVF's there was no episiotomy, no tears no lacerations..  All counts were correct, mom and infant were doing well    Infant    Findings: male  infant  38w3d    Weight: No birth weight on file.   Length:    in  Observations/Comments:  scale 2      9   @ 1 minute /   9   @ 5 minutes      Infant observations:            Infant Name:      Placenta, Cord, and Fluid    Placenta  delivered  Spontaneous  at  12/12/2019 12:40 PM     Cord:    present.   Nuchal Cord?  yes; Number of nuchal loops present:1       Cord blood obtained: yes                 Repair    Episiotomy: {No   Lacerations:  Laceration none   Estimated Blood Loss: 400         Complications:                    None          Disposition  Mother and baby were stable and doing well.    Lakhwinder Robbins MD  12/12/19  12:51 PM

## 2019-12-12 NOTE — LACTATION NOTE
Lactation Consult Note  P1 term baby. He nursed well after delivery per Mom and latched easily now. He has nutritive suckle. Discussed ways to determine if baby is getting enough milk and encouraged to call for further assist. She has a breast pump at home.  Evaluation Completed: 2019 5:13 PM  Patient Name: Micaela Airza  :  1995  MRN:  [id number]     REFERRAL  INFORMATION:                          Date of Referral: 19   Person Making Referral: patient  Maternal Reason for Referral: breastfeeding currently       DELIVERY HISTORY:  Infant First Feeding: breastfeeding, skin-to-skin   Length of Breastfeeding Post-Delivery: 50   Skin to skin initiation date/time: 2019  12:40 PM   Skin to skin end date/time:              MATERNAL ASSESSMENT:     Breast Shape: wide (19 : Rosa Hitchcock RN)  Breast Density: soft (19 : Rosa Hitchcock RN)  Areola: elastic (19 : Rosa Hitchcock RN)  Nipples: everted (19 : Rosa Hitchcock RN)                INFANT ASSESSMENT:  Information for the patient's :  Krishna Ariza [[id number]]   No past medical history on file.    Feeding Readiness Cues: energy for feeding (19 : Rosa Hitchcock RN)   Feeding Method: breastfeeding (19 : Rosa Hitchcock RN)   Feeding Tolerance/Success: alert for feeding, coordinated suck, coordinated swallow (19 : Rosa Hitchcock RN)                   Feeding Interventions: latch assistance provided (19 : Rosa Hitchcock RN)       Additional Documentation: LATCH Score (Group) (19 : Rosa Hitchcock RN)           Breastfeeding: breastfeeding, right side only (19 : Rosa Hitchcock RN)   Infant Positioning: cradle (19 : Rosa Hitchcock RN)           Effective Latch During Feeding: yes (19 : Rosa Hitchcock RN)   Suck/Swallow  Coordination: absent (19 : Rosa Hitchcock RN)   Signs of Milk Transfer: infant jaw motion present, suck/swallow ratio (19 : Rosa Hitchcock RN)       Latch: 2-->grasps breast, tongue down, lips flanged, rhythmic sucking (19 : Rosa Hitchcock RN)   Audible Swallowin-->a few with stimulation (19 : Rosa Hitchcock RN)   Type of Nipple: 2-->everted (after stimulation) (19 : Rosa Hitchcock RN)   Comfort (Breast/Nipple): 2-->soft/nontender (19 : Rosa Hitchcock RN)   Hold (Positioning): 1-->minimal assist, teach one side, mother does other, staff holds (19 : Rosa Hitchcock RN)   Latch Score: 8 (19 : Rosa Hitchcock RN)     Infant-Driven Feeding Scales - Readiness: Alert once handled. Some rooting or takes pacifier. Adequate tone. (19 : Rosa Hitchcock RN)   Infant-Driven Feeding Scales - Quality: Nipples with a strong coordinated SSB throughout feed. (19 : Rosa Hitchcock RN)             MATERNAL INFANT FEEDING:  Maternal Preparation: breast care (19 : Rosa Hitchcock RN)  Maternal Emotional State: assist needed (19 : Rosa Hitchcock RN)  Infant Positioning: cradle (19 : Roas Hitchcock RN)   Signs of Milk Transfer: infant jaw motion present, suck/swallow ratio (19 : Rosa Hitchcock RN)  Pain with Feeding: yes (19 : Rosa Hitchcock RN)           Milk Ejection Reflex: present (19 : Rosa Hitchcock RN)           Latch Assistance: yes (19 : Rosa Hitchcock, RN)                               EQUIPMENT TYPE:  Breast Pump Type: double electric, personal (19 : Rosa Hitchcock, RN)                              BREAST PUMPING:          LACTATION REFERRALS:

## 2019-12-13 PROBLEM — R82.998 LEUKOCYTES IN URINE: Status: RESOLVED | Noted: 2019-09-30 | Resolved: 2019-12-13

## 2019-12-13 PROBLEM — O36.8190 DECREASED FETAL MOVEMENT DURING PREGNANCY, ANTEPARTUM: Status: RESOLVED | Noted: 2019-12-11 | Resolved: 2019-12-13

## 2019-12-13 LAB
BASOPHILS # BLD AUTO: 0.02 10*3/MM3 (ref 0–0.2)
BASOPHILS NFR BLD AUTO: 0.2 % (ref 0–1.5)
DEPRECATED RDW RBC AUTO: 38.1 FL (ref 37–54)
EOSINOPHIL # BLD AUTO: 0.1 10*3/MM3 (ref 0–0.4)
EOSINOPHIL NFR BLD AUTO: 1 % (ref 0.3–6.2)
ERYTHROCYTE [DISTWIDTH] IN BLOOD BY AUTOMATED COUNT: 11.9 % (ref 12.3–15.4)
HCT VFR BLD AUTO: 29.5 % (ref 34–46.6)
HGB BLD-MCNC: 10 G/DL (ref 12–15.9)
IMM GRANULOCYTES # BLD AUTO: 0.06 10*3/MM3 (ref 0–0.05)
IMM GRANULOCYTES NFR BLD AUTO: 0.6 % (ref 0–0.5)
LYMPHOCYTES # BLD AUTO: 1.69 10*3/MM3 (ref 0.7–3.1)
LYMPHOCYTES NFR BLD AUTO: 16.6 % (ref 19.6–45.3)
MCH RBC QN AUTO: 29.6 PG (ref 26.6–33)
MCHC RBC AUTO-ENTMCNC: 33.9 G/DL (ref 31.5–35.7)
MCV RBC AUTO: 87.3 FL (ref 79–97)
MONOCYTES # BLD AUTO: 0.56 10*3/MM3 (ref 0.1–0.9)
MONOCYTES NFR BLD AUTO: 5.5 % (ref 5–12)
NEUTROPHILS # BLD AUTO: 7.74 10*3/MM3 (ref 1.7–7)
NEUTROPHILS NFR BLD AUTO: 76.1 % (ref 42.7–76)
NRBC BLD AUTO-RTO: 0 /100 WBC (ref 0–0.2)
PLATELET # BLD AUTO: 210 10*3/MM3 (ref 140–450)
PMV BLD AUTO: 9.8 FL (ref 6–12)
RBC # BLD AUTO: 3.38 10*6/MM3 (ref 3.77–5.28)
WBC NRBC COR # BLD: 10.17 10*3/MM3 (ref 3.4–10.8)

## 2019-12-13 PROCEDURE — 85025 COMPLETE CBC W/AUTO DIFF WBC: CPT | Performed by: OBSTETRICS & GYNECOLOGY

## 2019-12-13 RX ADMIN — IBUPROFEN 800 MG: 800 TABLET, FILM COATED ORAL at 04:22

## 2019-12-13 RX ADMIN — IBUPROFEN 800 MG: 800 TABLET, FILM COATED ORAL at 12:48

## 2019-12-13 RX ADMIN — DOCUSATE SODIUM 100 MG: 100 CAPSULE, LIQUID FILLED ORAL at 20:46

## 2019-12-13 RX ADMIN — DOCUSATE SODIUM 100 MG: 100 CAPSULE, LIQUID FILLED ORAL at 12:47

## 2019-12-13 RX ADMIN — IBUPROFEN 800 MG: 800 TABLET, FILM COATED ORAL at 20:47

## 2019-12-13 NOTE — LACTATION NOTE
Mom reports baby has been BF a lot. Baby has paci in mouth now. Encouraged mom to hold off on paci for now and put baby to breast instead. Mom wanting assistance with positioning and latching. Educated mom on positioning and importance of deep latching. Baby latched well and mom reports no pain. Encouraged to call if needing further assistance.  Lactation Consult Note    Evaluation Completed: 2019 10:17 AM  Patient Name: Micaela Ariza  :  1995  MRN:  [id number]     REFERRAL  INFORMATION:                          Date of Referral: 19   Person Making Referral: patient  Maternal Reason for Referral: breastfeeding currently       DELIVERY HISTORY:  Infant First Feeding: breastfeeding, skin-to-skin   Length of Breastfeeding Post-Delivery: 50   Skin to skin initiation date/time: 2019  12:40 PM   Skin to skin end date/time:              MATERNAL ASSESSMENT:                               INFANT ASSESSMENT:  Information for the patient's :  Krishna Ariza [[id number]]   No past medical history on file.                                                                                                                                MATERNAL INFANT FEEDING:                                                                       EQUIPMENT TYPE:                                 BREAST PUMPING:          LACTATION REFERRALS:

## 2019-12-13 NOTE — PROGRESS NOTES
2019    Name:Micaela Ariza   MR#:[id number]    Vaginal Delivery Progress Note    HD#2    Subjective   Postpartum Day 1: 24 y.o. yo Female  delivered at 38w3d  delivered a male  infant.     The patient feels well.  Her pain is controlled.    She is ambulating well.  Patient describes her bleeding as thin lochia.    Breastfeeding: without difficulty.     Patient Active Problem List   Diagnosis   • Encounter for supervision of low-risk first pregnancy in third trimester   • Abnormal genetic test during pregnancy   • Slow transit constipation   • Pregnancy   • Helicobacter pylori ab+   • Herpes genitalis   • Herpes simplex virus type 2 (HSV-2) infection affecting pregnancy in third trimester   • Fetal size inconsistent with dates   • Decreased fetal movement       Objective   Vital Signs Range for the last 24 hours  Temp: Temp:  [98.1 °F (36.7 °C)-100.1 °F (37.8 °C)] 98.1 °F (36.7 °C) Temp src: Oral   BP: BP: (117-163)/(57-93) 123/83        Pulse: Heart Rate:  [] 86  RR: Resp:  [14-16] 16  Weight: 63.5 kg (140 lb)  BMI:  Body mass index is 27.34 kg/m².      Lab Results   Component Value Date    WBC 10.17 2019    HGB 10.0 (L) 2019    HCT 29.5 (L) 2019    MCV 87.3 2019     2019       Physical Exam  General:  no acute distresss.  Abdomen: abdomen is soft without significant tenderness, masses, organomegaly or guarding. Fundus: Firm with scant lochia  Extremities: no cyanosis, and 1+ edema, no CT    Perineum:  Intact    Assessment/Plan   1.  PPD# 1    Discussed circumcision.  Explained procedure including discussion regarding the risks, benefits, and alternatives.  Understanding of the procedure is voiced and questions are answered.  Patient wishes to proceed.           Plan:  Routine Postpartum care      Ayaan Reese MD  2019 9:10 AM

## 2019-12-14 VITALS
HEART RATE: 83 BPM | SYSTOLIC BLOOD PRESSURE: 130 MMHG | OXYGEN SATURATION: 98 % | HEIGHT: 60 IN | RESPIRATION RATE: 16 BRPM | BODY MASS INDEX: 27.48 KG/M2 | DIASTOLIC BLOOD PRESSURE: 90 MMHG | TEMPERATURE: 98.4 F | WEIGHT: 140 LBS

## 2019-12-14 LAB
ALBUMIN SERPL-MCNC: 3 G/DL (ref 3.5–5.2)
ALBUMIN/GLOB SERPL: 1 G/DL
ALP SERPL-CCNC: 157 U/L (ref 39–117)
ALT SERPL W P-5'-P-CCNC: 10 U/L (ref 1–33)
ANION GAP SERPL CALCULATED.3IONS-SCNC: 10.9 MMOL/L (ref 5–15)
AST SERPL-CCNC: 21 U/L (ref 1–32)
BILIRUB SERPL-MCNC: <0.2 MG/DL (ref 0.2–1.2)
BUN BLD-MCNC: 7 MG/DL (ref 6–20)
BUN/CREAT SERPL: 9.3 (ref 7–25)
CALCIUM SPEC-SCNC: 8.8 MG/DL (ref 8.6–10.5)
CHLORIDE SERPL-SCNC: 107 MMOL/L (ref 98–107)
CO2 SERPL-SCNC: 23.1 MMOL/L (ref 22–29)
CREAT BLD-MCNC: 0.75 MG/DL (ref 0.57–1)
GFR SERPL CREATININE-BSD FRML MDRD: 113 ML/MIN/1.73
GFR SERPL CREATININE-BSD FRML MDRD: 93 ML/MIN/1.73
GLOBULIN UR ELPH-MCNC: 2.9 GM/DL
GLUCOSE BLD-MCNC: 99 MG/DL (ref 65–99)
POTASSIUM BLD-SCNC: 4.1 MMOL/L (ref 3.5–5.2)
PROT SERPL-MCNC: 5.9 G/DL (ref 6–8.5)
SODIUM BLD-SCNC: 141 MMOL/L (ref 136–145)

## 2019-12-14 PROCEDURE — 90707 MMR VACCINE SC: CPT | Performed by: OBSTETRICS & GYNECOLOGY

## 2019-12-14 PROCEDURE — 25010000002 MEASLES, MUMPS & RUBELLA VAC RECONSTITUTED SOLUTION: Performed by: OBSTETRICS & GYNECOLOGY

## 2019-12-14 PROCEDURE — 90471 IMMUNIZATION ADMIN: CPT | Performed by: OBSTETRICS & GYNECOLOGY

## 2019-12-14 PROCEDURE — 80053 COMPREHEN METABOLIC PANEL: CPT | Performed by: OBSTETRICS & GYNECOLOGY

## 2019-12-14 RX ORDER — OXYCODONE HYDROCHLORIDE AND ACETAMINOPHEN 5; 325 MG/1; MG/1
1 TABLET ORAL EVERY 4 HOURS PRN
Qty: 10 TABLET | Refills: 0 | Status: SHIPPED | OUTPATIENT
Start: 2019-12-14 | End: 2019-12-22

## 2019-12-14 RX ORDER — IBUPROFEN 600 MG/1
600 TABLET ORAL EVERY 6 HOURS PRN
Qty: 60 TABLET | Refills: 1 | Status: SHIPPED | OUTPATIENT
Start: 2019-12-14 | End: 2022-06-28

## 2019-12-14 RX ADMIN — IBUPROFEN 800 MG: 800 TABLET, FILM COATED ORAL at 04:46

## 2019-12-14 RX ADMIN — IBUPROFEN 800 MG: 800 TABLET, FILM COATED ORAL at 13:00

## 2019-12-14 RX ADMIN — DOCUSATE SODIUM 100 MG: 100 CAPSULE, LIQUID FILLED ORAL at 13:01

## 2019-12-14 RX ADMIN — MEASLES, MUMPS, AND RUBELLA VIRUS VACCINE LIVE 0.5 ML: 1000; 12500; 1000 INJECTION, POWDER, LYOPHILIZED, FOR SUSPENSION SUBCUTANEOUS at 13:40

## 2019-12-14 NOTE — PLAN OF CARE
Problem: Patient Care Overview  Goal: Plan of Care Review  Outcome: Ongoing (interventions implemented as appropriate)  Goal: Individualization and Mutuality  Outcome: Ongoing (interventions implemented as appropriate)  Goal: Discharge Needs Assessment  Outcome: Ongoing (interventions implemented as appropriate)  Goal: Interprofessional Rounds/Family Conf  Outcome: Ongoing (interventions implemented as appropriate)     Problem: Fall Risk,  (Adult,Obstetrics,Pediatric)  Goal: Identify Related Risk Factors and Signs and Symptoms  Outcome: Ongoing (interventions implemented as appropriate)  Goal: Absence of Maternal Fall  Outcome: Ongoing (interventions implemented as appropriate)  Goal: Absence of  Fall/Drop  Outcome: Ongoing (interventions implemented as appropriate)     Problem: Skin Injury Risk (Adult)  Goal: Identify Related Risk Factors and Signs and Symptoms  Outcome: Ongoing (interventions implemented as appropriate)  Goal: Skin Health and Integrity  Outcome: Ongoing (interventions implemented as appropriate)     Problem: Anesthesia/Analgesia, Neuraxial (Obstetrics)  Goal: Signs and Symptoms of Listed Potential Problems Will be Absent, Minimized or Managed (Anesthesia/Analgesia, Neuraxial)  Outcome: Ongoing (interventions implemented as appropriate)     Problem: Postpartum (Vaginal Delivery) (Adult,Obstetrics,Pediatric)  Goal: Signs and Symptoms of Listed Potential Problems Will be Absent, Minimized or Managed (Postpartum)  Outcome: Ongoing (interventions implemented as appropriate)

## 2019-12-14 NOTE — PROGRESS NOTES
2019    Name:Micaela Ariza   MR#:[id number]    Vaginal Delivery Progress Note    HD#3    Subjective   Postpartum Day 2: 24 y.o. yo Female  delivered at 38w3d  delivered a male  infant.     The patient feels well.  Her pain is controlled.    She is ambulating well.  Patient describes her bleeding as thin lochia.    Breastfeeding: infant latching without difficulty.    No HA, visual changes, upper abd pain     Patient Active Problem List   Diagnosis   • Encounter for supervision of low-risk first pregnancy in third trimester   • Abnormal genetic test during pregnancy   • Slow transit constipation   • Pregnancy   • Helicobacter pylori ab+   • Herpes genitalis   • Herpes simplex virus type 2 (HSV-2) infection affecting pregnancy in third trimester   • Fetal size inconsistent with dates   • Decreased fetal movement       Objective   Vital Signs Range for the last 24 hours  Temp: Temp:  [98 °F (36.7 °C)-98.5 °F (36.9 °C)] 98.5 °F (36.9 °C) Temp src: Oral   BP: BP: (124-148)/(79-84) 148/83        Pulse: Heart Rate:  [72-93] 93  RR: Resp:  [14-18] 18  Weight: 63.5 kg (140 lb)  BMI:  Body mass index is 27.34 kg/m².      Lab Results   Component Value Date    WBC 10.17 2019    HGB 10.0 (L) 2019    HCT 29.5 (L) 2019    MCV 87.3 2019     2019       Physical Exam  General:  no acute distresss.  Abdomen: abdomen is soft without significant tenderness, masses, organomegaly or guarding. Fundus: Firm with scant lochia  Extremities: no cyanosis, and no edema, no CT    Perineum:  Intact    Assessment/Plan   1.  PPD# 2  2. Bps are elevated in the mild range, per nurse is more likely to be elevated when having cramping during breastfeeding, will obtain CMP and additional Bps prior to DC if appropriate. May need closer follow up for BP check as well. Reviewed s/sx of preeclampsia and precautions if DCed  3. Rubella  Nonimmune- PP MMR ordered    Desires circumcision. Discussed is  cosmetic. Risks of bleeding, damage to penis, infection and signs of infection, aftercare discussed. Desires to proceed.      Becki Barton MD  12/14/2019 10:59 AM

## 2019-12-14 NOTE — PROGRESS NOTES
Vaginal delivery Discharge Summary      Date of Admission: 2019    Date of Discharge:  2019    Patient: Micaela Ariza      MR#:[id number]    Surgeon/OB: Lakhwinder Robbins     Discharge Diagnosis: Vaginal Delivery at 38w3d, IOL for decreased fetal movement, uncomplicated recovery    Procedures:  Vaginal, Spontaneous     2019    12:37 PM      Anesthesia:  Epidural     Presenting Problem/History of Present Illness  Pregnancy [Z34.90]  Decreased fetal movement [O36.8190]     Patient Active Problem List   Diagnosis   • Encounter for supervision of low-risk first pregnancy in third trimester   • Abnormal genetic test during pregnancy   • Slow transit constipation   • Pregnancy   • Helicobacter pylori ab+   • Herpes genitalis   • Herpes simplex virus type 2 (HSV-2) infection affecting pregnancy in third trimester   • Fetal size inconsistent with dates   • Decreased fetal movement       Hospital Course  Patient is a 24 y.o. female  at 38w3d status post vaginal delivery. She had IOL due to decreased fetal movement.  Uneventful recovery.  Patient is ambulating, tolerating a regular diet.  Perineum is intact. She did have some elevated blood pressures in mild range with normal labs and no symptoms. Plan to follow up soon as outpatient for blood pressure check  MMR given PP    Infant:   male  fetus 2833 g (6 lb 3.9 oz)  with Apgar scores of 9  , 9   at five minutes.    Condition on Discharge:  Stable    Vital Signs  Temp:  [98 °F (36.7 °C)-98.5 °F (36.9 °C)] 98.5 °F (36.9 °C)  Heart Rate:  [72-93] 93  Resp:  [14-18] 18  BP: (124-148)/(79-84) 148/83    Lab Results   Component Value Date    WBC 10.17 2019    HGB 10.0 (L) 2019    HCT 29.5 (L) 2019    MCV 87.3 2019     2019       Discharge Disposition  Home or Self Care    Discharge Medications     Discharge Medications      New Medications      Instructions Start Date   ibuprofen 600 MG tablet  Commonly known as:   ADVIL,MOTRIN   600 mg, Oral, Every 6 Hours PRN      oxyCODONE-acetaminophen 5-325 MG per tablet  Commonly known as:  PERCOCET   1 tablet, Oral, Every 4 Hours PRN         Continue These Medications      Instructions Start Date   docusate sodium 100 MG capsule  Commonly known as:  COLACE   100 mg, Oral, 2 Times Daily      prenatal vitamin 27-0.8 27-0.8 MG tablet tablet   Oral, 3 Times Weekly         Stop These Medications    valACYclovir 500 MG tablet  Commonly known as:  VALTREX            Discharge Diet: Regular    Activity at Discharge:   Activity Instructions     Pelvic Rest      Nothing in the vagina for six weeks          Follow-up Appointments  Future Appointments   Date Time Provider Department Center   1/29/2020  3:00 PM Becki Barton MD [facility] None       Prenatal Labs for [facility] Patients  Lab Results   Component Value Date    HGB 10.0 (L) 12/13/2019    HEPBSAG Negative 05/13/2019    ABSCRN Negative 12/11/2019    [id number] Non Reactive 09/23/2019    HEPCVIRUSABY <0.1 05/13/2019       Prenatal Labs -- transcribed from paper records by Nurse  Lab Results   Component Value Date    ABO O 12/11/2019    RH Positive 12/11/2019    HEPBSAG Negative 05/13/2019    RPR Comment 09/23/2019    [id number] Non Reactive 09/23/2019         Becki Barton MD  12/14/19  11:04 AM

## 2019-12-16 ENCOUNTER — TELEPHONE (OUTPATIENT)
Dept: OBSTETRICS AND GYNECOLOGY | Age: 24
End: 2019-12-16

## 2019-12-19 ENCOUNTER — OFFICE VISIT (OUTPATIENT)
Dept: OBSTETRICS AND GYNECOLOGY | Age: 24
End: 2019-12-19

## 2019-12-19 VITALS
WEIGHT: 126 LBS | HEIGHT: 60 IN | BODY MASS INDEX: 24.74 KG/M2 | SYSTOLIC BLOOD PRESSURE: 134 MMHG | DIASTOLIC BLOOD PRESSURE: 88 MMHG

## 2019-12-19 DIAGNOSIS — R03.0 TRANSIENT ELEVATED BLOOD PRESSURE: Primary | ICD-10-CM

## 2019-12-19 LAB
BILIRUB BLD-MCNC: NEGATIVE MG/DL
CLARITY, POC: CLEAR
COLOR UR: YELLOW
GLUCOSE UR STRIP-MCNC: NEGATIVE MG/DL
KETONES UR QL: NEGATIVE
LEUKOCYTE EST, POC: ABNORMAL
NITRITE UR-MCNC: NEGATIVE MG/ML
PH UR: 6 [PH] (ref 5–8)
PROT UR STRIP-MCNC: ABNORMAL MG/DL
RBC # UR STRIP: ABNORMAL /UL
SP GR UR: 1.02 (ref 1–1.03)
UROBILINOGEN UR QL: ABNORMAL

## 2019-12-19 PROCEDURE — 0503F POSTPARTUM CARE VISIT: CPT | Performed by: PHYSICIAN ASSISTANT

## 2019-12-19 PROCEDURE — 81002 URINALYSIS NONAUTO W/O SCOPE: CPT | Performed by: PHYSICIAN ASSISTANT

## 2019-12-19 RX ORDER — VALACYCLOVIR HYDROCHLORIDE 500 MG/1
500 TABLET, FILM COATED ORAL 2 TIMES DAILY
COMMUNITY
End: 2019-12-19 | Stop reason: SDUPTHER

## 2019-12-19 RX ORDER — VALACYCLOVIR HYDROCHLORIDE 500 MG/1
500 TABLET, FILM COATED ORAL DAILY
Qty: 30 TABLET | Refills: 3 | Status: SHIPPED | OUTPATIENT
Start: 2019-12-19 | End: 2021-03-22

## 2019-12-19 NOTE — PROGRESS NOTES
"    Patient presents for a postpartum visit. She is 1 week postpartum following a spontaneous vaginal delivery. I have fully reviewed the prenatal and intrapartum course. The delivery was at 38 gestational weeks. Outcome: spontaneous vaginal delivery. Anesthesia: epidural. Postpartum course has been good. Baby's course has been good. Baby is feeding by breast. Bleeding moderate lochia, red and clots. Bowel function is normal. Bladder function is normal. Patient is not sexually active. Contraception method is abstinence. Postpartum depression screening: negative.    Pt had some elevated BP's in hospital but neg proteinuria and CMP wnl  Here today for f/u  Feels good, baby sleeping well  No edema or vision changes  Has some \"pressure\" in her head occly   Eating and drinking well  Denies ppd, feels great and has good support    The following portions of the patient's history were reviewed and updated as appropriate: allergies, current medications, past family history, past medical history, past social history, past surgical history and problem list.    Review of Systems  pp BP check        Vitals:    12/19/19 1142   BP: 134/88       General:  alert, appears stated age, cooperative and no distress    Breasts:  NA   Lungs: clear to auscultation bilaterally   Heart:  regular rate and rhythm   Abdomen: NA    Vulva:  not evaluated   Vagina: not evaluated   Cervix:  NA   Corpus: not examined   Adnexa:  not evaluated   Rectal Exam: Not performed.       good reflexes noted in BLE    1 wk postpartum exam. Pap smear not done at today's visit.    1. Contraception: abstinence and IUD  2. BP stable and improving. No edema. +1 protein but large blood. Ordered labs as well.   3. Follow up in: 1 week or as needed.  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 Advised to call for BP >140/90 and or sx's of elevated BP.  C/w good hydration and rest  "

## 2019-12-20 LAB
ALBUMIN SERPL-MCNC: 3.8 G/DL (ref 3.5–5.2)
ALBUMIN/GLOB SERPL: 1.4 G/DL
ALP SERPL-CCNC: 145 U/L (ref 39–117)
ALT SERPL-CCNC: 14 U/L (ref 1–33)
AST SERPL-CCNC: 11 U/L (ref 1–32)
BASOPHILS # BLD AUTO: 0.04 10*3/MM3 (ref 0–0.2)
BASOPHILS NFR BLD AUTO: 0.5 % (ref 0–1.5)
BILIRUB SERPL-MCNC: 0.4 MG/DL (ref 0.2–1.2)
BUN SERPL-MCNC: 13 MG/DL (ref 6–20)
BUN/CREAT SERPL: 18.1 (ref 7–25)
CALCIUM SERPL-MCNC: 9.3 MG/DL (ref 8.6–10.5)
CHLORIDE SERPL-SCNC: 105 MMOL/L (ref 98–107)
CO2 SERPL-SCNC: 24.1 MMOL/L (ref 22–29)
CREAT SERPL-MCNC: 0.72 MG/DL (ref 0.57–1)
EOSINOPHIL # BLD AUTO: 0.14 10*3/MM3 (ref 0–0.4)
EOSINOPHIL NFR BLD AUTO: 1.7 % (ref 0.3–6.2)
ERYTHROCYTE [DISTWIDTH] IN BLOOD BY AUTOMATED COUNT: 12 % (ref 12.3–15.4)
GLOBULIN SER CALC-MCNC: 2.8 GM/DL
GLUCOSE SERPL-MCNC: 64 MG/DL (ref 65–99)
HCT VFR BLD AUTO: 33.4 % (ref 34–46.6)
HGB BLD-MCNC: 11.4 G/DL (ref 12–15.9)
IMM GRANULOCYTES # BLD AUTO: 0.02 10*3/MM3 (ref 0–0.05)
IMM GRANULOCYTES NFR BLD AUTO: 0.2 % (ref 0–0.5)
LYMPHOCYTES # BLD AUTO: 1.83 10*3/MM3 (ref 0.7–3.1)
LYMPHOCYTES NFR BLD AUTO: 22.4 % (ref 19.6–45.3)
MCH RBC QN AUTO: 30.3 PG (ref 26.6–33)
MCHC RBC AUTO-ENTMCNC: 34.1 G/DL (ref 31.5–35.7)
MCV RBC AUTO: 88.8 FL (ref 79–97)
MONOCYTES # BLD AUTO: 0.6 10*3/MM3 (ref 0.1–0.9)
MONOCYTES NFR BLD AUTO: 7.4 % (ref 5–12)
NEUTROPHILS # BLD AUTO: 5.53 10*3/MM3 (ref 1.7–7)
NEUTROPHILS NFR BLD AUTO: 67.8 % (ref 42.7–76)
NRBC BLD AUTO-RTO: 0 /100 WBC (ref 0–0.2)
PLATELET # BLD AUTO: 355 10*3/MM3 (ref 140–450)
POTASSIUM SERPL-SCNC: 4.8 MMOL/L (ref 3.5–5.2)
PROT SERPL-MCNC: 6.6 G/DL (ref 6–8.5)
RBC # BLD AUTO: 3.76 10*6/MM3 (ref 3.77–5.28)
SODIUM SERPL-SCNC: 142 MMOL/L (ref 136–145)
WBC # BLD AUTO: 8.16 10*3/MM3 (ref 3.4–10.8)

## 2019-12-21 LAB
BACTERIA UR CULT: NO GROWTH
BACTERIA UR CULT: NORMAL

## 2019-12-22 ENCOUNTER — TELEPHONE (OUTPATIENT)
Dept: OBSTETRICS AND GYNECOLOGY | Age: 24
End: 2019-12-22

## 2019-12-22 NOTE — TELEPHONE ENCOUNTER
Pt called overnight w mastitis v engorgement, but then also developed fever and chills. Went to [facility] and received IM injection and also antibiotic to continue at home. Already feeling better. Discussed lecithin use if having issue with clogged ducts.  To always call if not continuing to improve    Becki Barton MD  12/22/2019  10:47 AM

## 2020-01-06 ENCOUNTER — TELEPHONE (OUTPATIENT)
Dept: OBSTETRICS AND GYNECOLOGY | Age: 25
End: 2020-01-06

## 2020-01-06 NOTE — TELEPHONE ENCOUNTER
(Mick pt) Pt's son was diagnosed with Thrush and they advised her to call us for a prescription. Pharmacy is verified.     _____________    Attempted to call back, miconazole ointment to pharmacy    Becki Barton MD  1/6/2020  4:45 PM

## 2020-01-29 ENCOUNTER — POSTPARTUM VISIT (OUTPATIENT)
Dept: OBSTETRICS AND GYNECOLOGY | Age: 25
End: 2020-01-29

## 2020-01-29 VITALS
BODY MASS INDEX: 23.36 KG/M2 | DIASTOLIC BLOOD PRESSURE: 80 MMHG | HEIGHT: 60 IN | SYSTOLIC BLOOD PRESSURE: 126 MMHG | WEIGHT: 119 LBS

## 2020-01-29 PROBLEM — Z34.03 ENCOUNTER FOR SUPERVISION OF LOW-RISK FIRST PREGNANCY IN THIRD TRIMESTER: Status: RESOLVED | Noted: 2019-05-13 | Resolved: 2020-01-29

## 2020-01-29 PROBLEM — O28.5 ABNORMAL GENETIC TEST DURING PREGNANCY: Status: RESOLVED | Noted: 2019-06-10 | Resolved: 2020-01-29

## 2020-01-29 PROCEDURE — 0503F POSTPARTUM CARE VISIT: CPT | Performed by: OBSTETRICS & GYNECOLOGY

## 2020-01-29 RX ORDER — MEDROXYPROGESTERONE ACETATE 150 MG/ML
150 INJECTION, SUSPENSION INTRAMUSCULAR
Qty: 1 ML | Refills: 4 | Status: SHIPPED | OUTPATIENT
Start: 2020-01-29 | End: 2021-03-22

## 2020-01-29 NOTE — PROGRESS NOTES
"Office Postpartum Visit    Chief Complaint   Patient presents with   • Postpartum Care     6 wk PP vaginal. no lacerations. baby boy ZEALYN. breastmilk. pt undecided about BC       Subjective   Micaela Ariza is a 24 y.o. female who presents for a postpartum visit. She is 6 weeks postpartum following a spontaneous vaginal delivery. I have fully reviewed the prenatal and intrapartum course. The delivery was at 38 gestational weeks. Outcome: spontaneous vaginal delivery. Anesthesia: epidural. Postpartum course has been uncomplicated. Baby's course has been uncomplicaed. Baby is feeding by breast. Bleeding thin lochia. Bowel function is normal. Bladder function is normal. Patient is not sexually active. Contraception method is depo, considering kylena . Postpartum depression screening: negative.    The following portions of the patient's history were reviewed and updated as appropriate: allergies, current medications, past family history, past medical history, past social history, past surgical history and problem list.    Review of Systems  A comprehensive review of systems was negative.    Objective   /80   Ht 152.4 cm (60\")   Wt 54 kg (119 lb)   Breastfeeding Yes   BMI 23.24 kg/m²    General:  alert, appears stated age and cooperative   neck No thyromegaly or LAD   Abdomen: soft, non-tender; bowel sounds normal; no masses,  no organomegaly    Vulva:  normal and no laceration   Vagina: normal vagina   Cervix:  not evaluated   Corpus: not examined   Adnexa:  not evaluated   Rectal Exam: Not performed.     Assessment/Plan     6 wk postpartum exam. Pap smear 5/19, normal, no hx abnormal    1. Contraception: depo, will consider IUD  2.  Reviewed returning to work and pumping, possible supply issues  3. Follow up in: 1 year or as needed.    Becki Barton MD  01/29/2020  3:34 PM    "

## 2020-03-03 ENCOUNTER — TRANSCRIBE ORDERS (OUTPATIENT)
Dept: PHYSICAL THERAPY | Facility: CLINIC | Age: 25
End: 2020-03-03

## 2020-03-03 DIAGNOSIS — S16.1XXA STRAIN OF NECK MUSCLE, INITIAL ENCOUNTER: Primary | ICD-10-CM

## 2020-03-04 ENCOUNTER — TREATMENT (OUTPATIENT)
Dept: PHYSICAL THERAPY | Facility: CLINIC | Age: 25
End: 2020-03-04

## 2020-03-04 DIAGNOSIS — S16.1XXD STRAIN OF NECK MUSCLE, SUBSEQUENT ENCOUNTER: Primary | ICD-10-CM

## 2020-03-04 PROCEDURE — 97110 THERAPEUTIC EXERCISES: CPT | Performed by: PHYSICAL THERAPIST

## 2020-03-04 PROCEDURE — 97014 ELECTRIC STIMULATION THERAPY: CPT | Performed by: PHYSICAL THERAPIST

## 2020-03-04 PROCEDURE — 97161 PT EVAL LOW COMPLEX 20 MIN: CPT | Performed by: PHYSICAL THERAPIST

## 2020-03-04 NOTE — PROGRESS NOTES
Physical Therapy Initial Evaluation and Plan of Care        Subjective Evaluation    History of Present Illness  Date of onset: 2020  Mechanism of injury: Patient was lifting a heavy product [place].      Patient Occupation: Warehouse   Precautions and Work Restrictions: light dutyPain  Current pain ratin  Location: cervical spine to right UT  Quality: dull ache and burning  Relieving factors: heat and rest  Aggravating factors: movement  Progression: worsening             Objective       Palpation     Additional Palpation Details  TTP to the right UT and lower cervical pvms and minimally to the levator.    Active Range of Motion   Cervical/Thoracic Spine   Cervical    Flexion: 28 degrees with pain  Extension: 31 degrees with pain  Left lateral flexion: 12 degrees with pain  Right lateral flexion: 13 degrees with pain    Right Shoulder   Flexion: 43 degrees with pain  Abduction: 43 degrees with pain    Strength/Myotome Testing     Additional Strength Details  N/T at this time.         Assessment & Plan     Assessment  Impairments: abnormal or restricted ROM, activity intolerance, lacks appropriate home exercise program and pain with function  Assessment details: Patient presents with reports of pain, TTP and limited AROM which is limiting her ability to perform full job duties and ADL'S.  Barriers to therapy: none  Prognosis: good  Prognosis details: STG's  1)  Independent with HEP  2)  Decrease pain by 50% or more  3)  AROM WNL for the cervical spine and right shoulder    LTG's  1)  Independent with HEP progression  2)  Decrease pain by 75% or more  3)  Increase strength for the right shoulder to 4/5 or more  4)  No TTP present  5)  Patient to lift waist to shoulder up to 30 lbs  6)  Patient to perform ADL'S without limitations       Plan  Therapy options: will be seen for skilled physical therapy services  Planned modality interventions: ultrasound, TENS and thermotherapy (hydrocollator packs)  Planned therapy  interventions: strengthening, stretching, therapeutic activities, home exercise program and neuromuscular re-education  Treatment plan discussed with: patient        Manual Therapy:    0     mins  29840;  Therapeutic Exercise:    8     mins  63528;     Neuromuscular Martha:    0    mins  85425;    Therapeutic Activity:     0     mins  51488;     Gait Trainin     mins  62886;     Ultrasound:     0     mins  26461;    Work Hardening           0      mins 82231  Iontophoresis               0   mins 17264    Timed Treatment:   8   mins   Total Treatment:     34   mins    PT SIGNATURE: Simon Jackson, PT   DATE TREATMENT INITIATED: 3/4/2020    Initial Certification  Certification Period: 2020  I certify that the therapy services are furnished while this patient is under my care.  The services outlined above are required by this patient, and will be reviewed every 90 days.     PHYSICIAN: Bobby Gallo MD      DATE:     Please sign and return via fax to [phone].. Thank you, [facility].

## 2020-03-16 ENCOUNTER — TREATMENT (OUTPATIENT)
Dept: PHYSICAL THERAPY | Facility: CLINIC | Age: 25
End: 2020-03-16

## 2020-03-16 DIAGNOSIS — S16.1XXD STRAIN OF NECK MUSCLE, SUBSEQUENT ENCOUNTER: Primary | ICD-10-CM

## 2020-03-16 PROCEDURE — 97014 ELECTRIC STIMULATION THERAPY: CPT | Performed by: PHYSICAL THERAPIST

## 2020-03-16 PROCEDURE — 97110 THERAPEUTIC EXERCISES: CPT | Performed by: PHYSICAL THERAPIST

## 2020-03-16 NOTE — PROGRESS NOTES
"Physical Therapy Daily Progress Note    Visit # 2    Micaela Ariza reports: \"I'm feeling a lot better. I took off all last week so it was feeling better but today was my first day back and it started bothering me after moving around a while, but not quite as bad as it was before.\"     Subjective Evaluation    Pain  Current pain rating: 3  Location: posterior cervical spine, (R) upper trap and levator regions           Objective   See Exercise, Manual, and Modality Logs for complete treatment.   *Initiated supine shoulder flexion, supine chin tucks, and sidelying thoracic rotation    Assessment & Plan     Assessment  Assessment details: Patient reports mild symptom improvement despite early timeframe in PT intervention as yet as well as nearly 2 week hiatus in PT treatment.  She verbalizes HEP compliance with gradually improving symptoms as a result.  She is able to progress basic ROM/mobility exercises this date without symptom provocation and responds positively to modality application following treatment.        Progress per Plan of Care         Manual Therapy:         mins  87281;  Therapeutic Exercise:    34     mins  53908;     Neuromuscular Martha:        mins  57841;    Therapeutic Activity:          mins  75782;     Gait Training:           mins  79547;     Ultrasound:          mins  28673;    Electrical Stimulation:    15     mins  41642 ( );  Dry Needling          mins self-pay    Timed Treatment:   34   mins   Total Treatment:     51   mins    Victorina Gan PT, DPT  Physical Therapist  [place] License # [id number]    "

## 2020-03-17 ENCOUNTER — TREATMENT (OUTPATIENT)
Dept: PHYSICAL THERAPY | Facility: CLINIC | Age: 25
End: 2020-03-17

## 2020-03-17 DIAGNOSIS — S16.1XXD STRAIN OF NECK MUSCLE, SUBSEQUENT ENCOUNTER: Primary | ICD-10-CM

## 2020-03-17 PROCEDURE — 97110 THERAPEUTIC EXERCISES: CPT | Performed by: PHYSICAL THERAPIST

## 2020-03-17 PROCEDURE — 97014 ELECTRIC STIMULATION THERAPY: CPT | Performed by: PHYSICAL THERAPIST

## 2020-03-17 NOTE — PROGRESS NOTES
"Physical Therapy Daily Progress Note      Visit # 3      Subjective Evaluation    History of Present Illness    Subjective comment: Pt reports that her neck is \"slightly better\" today then it was yseterday.       Objective   See Exercise, Manual, and Modality Logs for complete treatment.   Added Tb Rows    Assessment & Plan     Assessment  Assessment details: Pt tolerated treatment well.  She completed her prescribed exercises without provocation of symptoms. She responded well to modalities at the end of treatment.                     Manual Therapy:    0     mins  90072;  Therapeutic Exercise:    33     mins  12844;     Neuromuscular Martha:    0    mins  02360;    Therapeutic Activity:     0     mins  76681;     Gait Trainin     mins  60388;     Ultrasound:     0     mins  73432;    Work Hardening           0      mins 86472  Iontophoresis               0   mins 67859    Timed Treatment:   33   mins   Total Treatment:     50   mins    Jacob Vivar PTA  Physical Therapist  "

## 2020-03-18 ENCOUNTER — TREATMENT (OUTPATIENT)
Dept: PHYSICAL THERAPY | Facility: CLINIC | Age: 25
End: 2020-03-18

## 2020-03-18 DIAGNOSIS — S16.1XXD STRAIN OF NECK MUSCLE, SUBSEQUENT ENCOUNTER: Primary | ICD-10-CM

## 2020-03-18 PROCEDURE — 97014 ELECTRIC STIMULATION THERAPY: CPT | Performed by: PHYSICAL THERAPIST

## 2020-03-18 PROCEDURE — 97110 THERAPEUTIC EXERCISES: CPT | Performed by: PHYSICAL THERAPIST

## 2020-03-18 NOTE — PROGRESS NOTES
Physical Therapy Daily Progress Note          Subjective  Patient reports that the neck isn't too bad, currently rates the pain at 10.    Objective   See Exercise, Manual, and Modality Logs for complete treatment.       Assessment/Plan  Subjective reports continue to gradually improve.  Supine shoulder AAROM is limited to about 110 degrees of flexion.  Patient performed the exercise routine without increased pain in the shoulder region.  Plan to progress the scapular stabilization exercises as the patient can tolerate.                     Manual Therapy:    0     mins  46815;  Therapeutic Exercise:    24     mins  22820;     Neuromuscular Martha:    0    mins  97611;    Therapeutic Activity:     0     mins  80152;     Gait Trainin     mins  59551;     Ultrasound:     0     mins  88866;    Work Hardening           0      mins 77863  Iontophoresis               0   mins 06283    Timed Treatment:   24   mins   Total Treatment:     39   mins    Simon Jackson, PT  Physical Therapist

## 2020-04-29 ENCOUNTER — DOCUMENTATION (OUTPATIENT)
Dept: PHYSICAL THERAPY | Facility: CLINIC | Age: 25
End: 2020-04-29

## 2020-12-02 ENCOUNTER — TELEPHONE (OUTPATIENT)
Dept: OBSTETRICS AND GYNECOLOGY | Age: 25
End: 2020-12-02

## 2020-12-02 NOTE — TELEPHONE ENCOUNTER
(Mick Pt)     Pt called stating both breasts are very swollen and would like something called into the pharmacy. Pt stated this has been going on for a week.     Please advise     Pharmacy Verified     [phone]

## 2020-12-02 NOTE — TELEPHONE ENCOUNTER
Called pt back, worried about mastitis and sx similar, weaned last week.  Her breasts are very firm, tender, has chills but has not had a true fever    Dicloxacillin sent into the pharmacy, if not improving to call right away.    Becki Barton MD  12/2/2020  16:41 EST

## 2021-03-22 RX ORDER — VALACYCLOVIR HYDROCHLORIDE 500 MG/1
TABLET, FILM COATED ORAL
Qty: 180 TABLET | Refills: 6 | Status: SHIPPED | OUTPATIENT
Start: 2021-03-22 | End: 2022-06-28

## 2021-03-22 RX ORDER — MEDROXYPROGESTERONE ACETATE 150 MG/ML
150 INJECTION, SUSPENSION INTRAMUSCULAR
Qty: 1 ML | Refills: 4 | Status: SHIPPED | OUTPATIENT
Start: 2021-03-22 | End: 2022-06-28

## 2021-03-29 ENCOUNTER — OFFICE VISIT (OUTPATIENT)
Dept: OBSTETRICS AND GYNECOLOGY | Age: 26
End: 2021-03-29

## 2021-03-29 VITALS
WEIGHT: 118 LBS | DIASTOLIC BLOOD PRESSURE: 68 MMHG | SYSTOLIC BLOOD PRESSURE: 118 MMHG | BODY MASS INDEX: 23.16 KG/M2 | HEIGHT: 60 IN

## 2021-03-29 DIAGNOSIS — Z20.2 EXPOSURE TO STD: Primary | ICD-10-CM

## 2021-03-29 PROBLEM — O36.8190 DECREASED FETAL MOVEMENT: Status: RESOLVED | Noted: 2019-12-11 | Resolved: 2021-03-29

## 2021-03-29 PROBLEM — O26.849 FETAL SIZE INCONSISTENT WITH DATES: Status: RESOLVED | Noted: 2019-11-18 | Resolved: 2021-03-29

## 2021-03-29 PROBLEM — Z34.90 PREGNANCY: Status: RESOLVED | Noted: 2019-08-17 | Resolved: 2021-03-29

## 2021-03-29 PROCEDURE — 99395 PREV VISIT EST AGE 18-39: CPT | Performed by: OBSTETRICS & GYNECOLOGY

## 2021-03-30 LAB
HCV AB S/CO SERPL IA: <0.1 S/CO RATIO (ref 0–0.9)
HIV 1+2 AB+HIV1 P24 AG SERPL QL IA: NON REACTIVE
RPR SER QL: NORMAL

## 2021-03-31 LAB
C TRACH RRNA SPEC QL NAA+PROBE: NEGATIVE
N GONORRHOEA RRNA SPEC QL NAA+PROBE: NEGATIVE
T VAGINALIS DNA SPEC QL NAA+PROBE: NEGATIVE

## 2021-04-08 ENCOUNTER — TELEPHONE (OUTPATIENT)
Dept: OBSTETRICS AND GYNECOLOGY | Age: 26
End: 2021-04-08

## 2021-04-08 RX ORDER — FLUCONAZOLE 150 MG/1
TABLET ORAL
Qty: 2 TABLET | Refills: 0 | Status: SHIPPED | OUTPATIENT
Start: 2021-04-08 | End: 2022-06-28

## 2021-04-08 RX ORDER — METRONIDAZOLE 500 MG/1
500 TABLET ORAL 2 TIMES DAILY
Qty: 14 TABLET | Refills: 0 | Status: SHIPPED | OUTPATIENT
Start: 2021-04-08 | End: 2021-04-15

## 2021-04-08 NOTE — TELEPHONE ENCOUNTER
Dr. Barton Pt calling is having yeast infection, c/o discharge white, odor, irritated, no fever or chills.

## 2021-04-27 ENCOUNTER — OFFICE VISIT (OUTPATIENT)
Dept: OBSTETRICS AND GYNECOLOGY | Age: 26
End: 2021-04-27

## 2021-04-27 VITALS
HEIGHT: 60 IN | SYSTOLIC BLOOD PRESSURE: 112 MMHG | DIASTOLIC BLOOD PRESSURE: 64 MMHG | BODY MASS INDEX: 22.58 KG/M2 | WEIGHT: 115 LBS

## 2021-04-27 DIAGNOSIS — Z30.430 ENCOUNTER FOR IUD INSERTION: ICD-10-CM

## 2021-04-27 DIAGNOSIS — Z13.89 SCREENING FOR BLOOD OR PROTEIN IN URINE: ICD-10-CM

## 2021-04-27 DIAGNOSIS — N89.8 VAGINAL DISCHARGE: Primary | ICD-10-CM

## 2021-04-27 LAB
B-HCG UR QL: NEGATIVE
BILIRUB BLD-MCNC: NEGATIVE MG/DL
CLARITY, POC: CLEAR
COLOR UR: YELLOW
GLUCOSE UR STRIP-MCNC: NEGATIVE MG/DL
INTERNAL NEGATIVE CONTROL: NORMAL
INTERNAL POSITIVE CONTROL: NORMAL
KETONES UR QL: NEGATIVE
LEUKOCYTE EST, POC: ABNORMAL
Lab: NORMAL
NITRITE UR-MCNC: NEGATIVE MG/ML
PH UR: 6 [PH] (ref 5–8)
PROT UR STRIP-MCNC: NEGATIVE MG/DL
RBC # UR STRIP: NEGATIVE /UL
SP GR UR: 1.02 (ref 1–1.03)
UROBILINOGEN UR QL: NORMAL

## 2021-04-27 PROCEDURE — 81003 URINALYSIS AUTO W/O SCOPE: CPT | Performed by: PHYSICIAN ASSISTANT

## 2021-04-27 PROCEDURE — 81025 URINE PREGNANCY TEST: CPT | Performed by: PHYSICIAN ASSISTANT

## 2021-04-27 PROCEDURE — 58300 INSERT INTRAUTERINE DEVICE: CPT | Performed by: PHYSICIAN ASSISTANT

## 2021-04-27 NOTE — PROGRESS NOTES
IUD Insertion    No LMP recorded. Patient has had an implant.    Date of procedure:  4/27/2021    Risks and benefits discussed? yes  All questions answered? yes  Consents given by The patient  Written consent obtained? yes    Procedure documentation:     Urine pregnancy test was done and was NEGATIVE .  The risks (including infection,  bleeding, pain, and uterine perforation) and benefits of the procedure were  explained to the patient and Written informed consent was  obtained.    After verifying the patient had a low probability of being pregnant and met the criteria for insertion, a sterile speculum has placed and the cervix was cleansed with an antiseptic solution.  Vaginal discharge was scant.  The anterior lip of the cervix was grasped with an allis and the uterine cavity was gently sounded. There was no difficulty passing the sound through the cervix.  Cervical dilation did not need to be performed prior to placing the IUD.  The uterus was anteverted and sounded to 8 cms.  The Mirena was then prepared per the manufacturers instructions.    The Mirena was advanced to a point 2 cms from the fundus and then the arms were released from the sheath.  The device was advanced to the fundus and the device was released fully from the sheath.. The string was cut 2 cms in length.  Bleeding from the cervix was scant.    She tolerated the procedure without any difficulty.    Post procedure instructions: The patient was advised to call for any fever or for  prolonged or severe pain or bleeding. Pelvic rest  advised for 2 wks    Follow up needed: 6 weeks for IUD check      Notes vaginal irritation as well  Is very SA and thinks it could be contributing to her sx's  Denies std risk and just had std testing as well  Will do vaginal swab today, suspect BV. Disc using lubrication during IC to help protect tissue  Could consider metrogel prn infection

## 2021-04-29 LAB
A VAGINAE DNA VAG QL NAA+PROBE: NORMAL SCORE
BVAB2 DNA VAG QL NAA+PROBE: NORMAL SCORE
C ALBICANS DNA VAG QL NAA+PROBE: NEGATIVE
C GLABRATA DNA VAG QL NAA+PROBE: NEGATIVE
MEGA1 DNA VAG QL NAA+PROBE: NORMAL SCORE

## 2022-06-28 ENCOUNTER — OFFICE VISIT (OUTPATIENT)
Dept: OBSTETRICS AND GYNECOLOGY | Age: 27
End: 2022-06-28

## 2022-06-28 VITALS
SYSTOLIC BLOOD PRESSURE: 120 MMHG | DIASTOLIC BLOOD PRESSURE: 72 MMHG | WEIGHT: 126 LBS | HEIGHT: 60 IN | BODY MASS INDEX: 24.74 KG/M2

## 2022-06-28 DIAGNOSIS — Z01.419 WELL WOMAN EXAM WITH ROUTINE GYNECOLOGICAL EXAM: Primary | ICD-10-CM

## 2022-06-28 DIAGNOSIS — R10.2 PELVIC CRAMPING: ICD-10-CM

## 2022-06-28 DIAGNOSIS — Z30.431 IUD CHECK UP: ICD-10-CM

## 2022-06-28 DIAGNOSIS — Z12.4 ENCOUNTER FOR PAPANICOLAOU SMEAR FOR CERVICAL CANCER SCREENING: ICD-10-CM

## 2022-06-28 PROCEDURE — 99395 PREV VISIT EST AGE 18-39: CPT | Performed by: PHYSICIAN ASSISTANT

## 2022-06-28 RX ORDER — LEVONORGESTREL 52 MG/1
1 INTRAUTERINE DEVICE INTRAUTERINE
COMMUNITY

## 2022-06-28 RX ORDER — VALACYCLOVIR HYDROCHLORIDE 500 MG/1
500 TABLET, FILM COATED ORAL DAILY
Qty: 90 TABLET | Refills: 3 | Status: SHIPPED | OUTPATIENT
Start: 2022-06-28 | End: 2022-07-28

## 2022-06-28 NOTE — PROGRESS NOTES
Subjective     Chief Complaint   Patient presents with   • Gynecologic Exam     Cc: annual visit today and iud check , last pap  neg , mirena inserted 21 - no periods , this past month noticed more abdominal cramping which bothered patient a little more otherwise has no problems with iud        History of Present Illness    Micaela Ariza is a 27 y.o.  who presents for annual exam.    Notes some pelvic cramping  Onset 2 wks ago  No menses since iud placed  Present for a year  Happy with it otherwise  Partner did get vasectomy as well    Son is 2.5 years now  Busy    No new med issues  Due for pap  Declines std testing      Her menses are rare, lasting 0-3 days, dysmenorrhea none   Obstetric History:  OB History        1    Para   1    Term   1            AB        Living   1       SAB        IAB        Ectopic        Molar        Multiple   0    Live Births   1          Obstetric Comments   2019 8w0d Dating US:  Estimated Date of Delivery: 19 based on US kb  2019 19w1d normal and completed anatomic survey, male fetus, CL=3.61 cm  hb                   Menstrual History:     No LMP recorded (lmp unknown). Patient has had an implant.         Current contraception: IUD  History of abnormal Pap smear: yes - 2 years ago  Received Gardasil immunization: no  Perform regular self breast exam: yes - occl  Family history of uterine or ovarian cancer: no  Family History of colon cancer: no  Family history of breast cancer: no    Mammogram: not indicated.  Colonoscopy: not indicated.  DEXA: not indicated.    Exercise: moderately active  Calcium/Vitamin D: adequate intake    The following portions of the patient's history were reviewed and updated as appropriate: allergies, current medications, past family history, past medical history, past social history, past surgical history and problem list.    Review of Systems   Genitourinary:        Cramping     All other systems reviewed and  "are negative.      Review of Systems   Constitutional: Negative for fatigue.   Respiratory: Negative for shortness of breath.    Gastrointestinal: Negative for abdominal pain.   Genitourinary: Negative for dysuria.   Neurological: Negative for headaches.   Psychiatric/Behavioral: Negative for dysphoric mood.         Objective   Physical Exam     /72   Ht 152.4 cm (60\")   Wt 57.2 kg (126 lb)   LMP  (LMP Unknown)   Breastfeeding No   BMI 24.61 kg/m²   General:   alert, comfortable and no distress   Heart: regular rate and rhythm   Lungs: clear to auscultation bilaterally   Breast: normal appearance, no masses or tenderness, Inspection negative, No nipple retraction or dimpling, No nipple discharge or bleeding, No axillary or supraclavicular adenopathy, Normal to palpation without dominant masses   Neck: no adenopathy and no carotid bruit   Abdomen: normal findings: soft, non-tender   CVA: Not performed today   Pelvis: External genitalia: normal general appearance  Vaginal: normal mucosa without prolapse or lesions  Cervix: normal appearance, thin prep PAP obtained and IUD string visualized, no clear sign of partial expulsion noted, iud string seen and felt  Adnexa: normal bimanual exam  Uterus: normal single, nontender   Extremities: Not performed today   Neurologic: Not performed today   Psychiatric: Normal affect, judgement, and mood     Assessment & Plan   Diagnoses and all orders for this visit:    1. Well woman exam with routine gynecological exam (Primary)    2. IUD check up    3. Pelvic cramping  -     POC Urinalysis Dipstick  -     Urine Culture - Urine, Urine, Random Void    4. Encounter for Papanicolaou smear for cervical cancer screening  -     IGP, Rfx Aptima HPV ASCU    Other orders  -     valACYclovir (Valtrex) 500 MG tablet; Take 1 tablet by mouth Daily for 30 days.  Dispense: 90 tablet; Refill: 3        All questions answered.  Breast self exam technique reviewed and patient encouraged to " perform self-exam monthly.  Discussed healthy lifestyle modifications.  Recommended 30 minutes of aerobic exercise five times per week.  Discussed calcium needs to prevent osteoporosis.      Pap done  Declines std testing  Happy with iud but check for uti and u/s to check positioning  UA and urine culture sent as well

## 2022-06-30 LAB
BACTERIA UR CULT: NORMAL
BACTERIA UR CULT: NORMAL
CONV .: NORMAL
CYTOLOGIST CVX/VAG CYTO: NORMAL
CYTOLOGY CVX/VAG DOC CYTO: NORMAL
CYTOLOGY CVX/VAG DOC THIN PREP: NORMAL
DX ICD CODE: NORMAL
HIV 1 & 2 AB SER-IMP: NORMAL
OTHER STN SPEC: NORMAL
STAT OF ADQ CVX/VAG CYTO-IMP: NORMAL

## 2023-08-07 RX ORDER — VALACYCLOVIR HYDROCHLORIDE 500 MG/1
TABLET, FILM COATED ORAL
Qty: 90 TABLET | Refills: 0 | Status: SHIPPED | OUTPATIENT
Start: 2023-08-07

## 2023-10-03 ENCOUNTER — TELEPHONE (OUTPATIENT)
Dept: OBSTETRICS AND GYNECOLOGY | Age: 28
End: 2023-10-03

## 2023-10-03 NOTE — TELEPHONE ENCOUNTER
Caller: Micaela Ariza    Relationship to patient: Self    Best call back number: [phone]    Chief complaint: CRAMPING- IUD    Type of visit: ULTRASOUND    Requested date: BEFORE 10/13/23 OR AFTER    If rescheduling, when is the original appointment: 7/18/22    Additional notes: PT CANCELED U/S APPT IN 2022. SHE FELT LIKE SHE DID NOT NEED APPT. PT IS NOW HAVING BAD CRAMPING AND BLEEDING WITH IUD IN PLACE. WOULD LIKE TO SCHEDULE FOR GYN F/U ULTRASOUND. ANNUAL SCHEDULED 10/13/23

## 2023-10-05 ENCOUNTER — OFFICE VISIT (OUTPATIENT)
Dept: OBSTETRICS AND GYNECOLOGY | Age: 28
End: 2023-10-05
Payer: COMMERCIAL

## 2023-10-05 VITALS
DIASTOLIC BLOOD PRESSURE: 70 MMHG | SYSTOLIC BLOOD PRESSURE: 108 MMHG | WEIGHT: 121 LBS | BODY MASS INDEX: 23.75 KG/M2 | HEIGHT: 60 IN

## 2023-10-05 DIAGNOSIS — N93.0 POSTCOITAL BLEEDING: ICD-10-CM

## 2023-10-05 DIAGNOSIS — Z30.431 IUD CHECK UP: Primary | ICD-10-CM

## 2023-10-05 LAB
BILIRUB BLD-MCNC: NEGATIVE MG/DL
CLARITY, POC: CLEAR
COLOR UR: YELLOW
GLUCOSE UR STRIP-MCNC: NEGATIVE MG/DL
KETONES UR QL: NEGATIVE
LEUKOCYTE EST, POC: NEGATIVE
NITRITE UR-MCNC: NEGATIVE MG/ML
PH UR: 6.5 [PH] (ref 5–8)
PROT UR STRIP-MCNC: NEGATIVE MG/DL
RBC # UR STRIP: ABNORMAL /UL
SP GR UR: 1.01 (ref 1–1.03)
UROBILINOGEN UR QL: ABNORMAL

## 2023-10-05 NOTE — PROGRESS NOTES
"Subjective     Chief Complaint   Patient presents with    Gynecologic Exam     IUD check, US today, Bleeding after IC       Micaela Ariza is a 28 y.o.  whose LMP is No LMP recorded. Patient has had an implant.     Pt presents today with chief complaint of bleeding and cramping after IC on monday  The next morning she just noted brown discharge  The cramping is still intermittent and still small amount of brown discharge  US confirms IUD in place  She is SA with same partner for 3 years but did find that he was on a dating eva so would like std screening  Denies abnormal discharge  Denies dysuria but has noted frequency and that her urine appeared slightly cloudy      No Additional Complaints Reported    The following portions of the patient's history were reviewed and updated as appropriate:vital signs, allergies, current medications, past medical history, past social history, past surgical history, and problem list      Review of Systems   Pertinent items are noted in HPI.     Objective      /70   Ht 152.4 cm (60\")   Wt 54.9 kg (121 lb)   BMI 23.63 kg/m²     Physical Exam  Genitourinary:           Comments: Friable tissue - silver nitrate applied      General:   alert and no distress   Heart: Not performed today   Lungs: Not performed today.   Breast: Not performed today   Neck: na   Abdomen: {Not performed today   CVA: Not performed today   Pelvis: External genitalia: normal general appearance  Urinary system: urethral meatus normal  Vaginal: normal mucosa without prolapse or lesions, normal without tenderness, induration or masses, normal rugae, and discharge, brown  Cervix: normal appearance, IUD string visualized, and silver nitrate applied to small area   Extremities: Not performed today   Neurologic: negative   Psychiatric: Normal affect, judgement, and mood       Lab Review   Labs: No data reviewed     Imaging   Ultrasound - Pelvic Vaginal    Assessment & Plan     ASSESSMENT  1. IUD check up  "   2. Postcoital bleeding        PLAN  1.   Orders Placed This Encounter   Procedures    NuSwab VG+ - Swab, Vagina    POC Urinalysis Dipstick       2. Medications prescribed this encounter:      No orders of the defined types were placed in this encounter.      3. Vaginal swab done for std's, BV and yeast. IUD in place. Ibuprofen as needed for cramping. Symptoms are improving and she is not longer bleeding.    Follow up: ROSETTE Silver, FOREIGN  10/5/2023

## 2023-10-09 LAB
A VAGINAE DNA VAG QL NAA+PROBE: NORMAL SCORE
BVAB2 DNA VAG QL NAA+PROBE: NORMAL SCORE
C ALBICANS DNA VAG QL NAA+PROBE: NEGATIVE
C GLABRATA DNA VAG QL NAA+PROBE: NEGATIVE
C TRACH DNA VAG QL NAA+PROBE: NEGATIVE
MEGA1 DNA VAG QL NAA+PROBE: NORMAL SCORE
N GONORRHOEA DNA VAG QL NAA+PROBE: NEGATIVE
T VAGINALIS DNA VAG QL NAA+PROBE: NEGATIVE

## 2023-10-13 ENCOUNTER — OFFICE VISIT (OUTPATIENT)
Dept: OBSTETRICS AND GYNECOLOGY | Age: 28
End: 2023-10-13
Payer: COMMERCIAL

## 2023-10-13 VITALS
BODY MASS INDEX: 23.75 KG/M2 | WEIGHT: 121 LBS | SYSTOLIC BLOOD PRESSURE: 110 MMHG | HEIGHT: 60 IN | DIASTOLIC BLOOD PRESSURE: 72 MMHG

## 2023-10-13 DIAGNOSIS — Z12.4 SCREENING FOR MALIGNANT NEOPLASM OF CERVIX: ICD-10-CM

## 2023-10-13 DIAGNOSIS — Z11.51 SCREENING FOR HUMAN PAPILLOMAVIRUS (HPV): ICD-10-CM

## 2023-10-13 DIAGNOSIS — R10.2 PELVIC PAIN: ICD-10-CM

## 2023-10-13 DIAGNOSIS — Z01.419 WELL FEMALE EXAM WITH ROUTINE GYNECOLOGICAL EXAM: Primary | ICD-10-CM

## 2023-10-13 DIAGNOSIS — Z97.5 PRESENCE OF IUD: ICD-10-CM

## 2023-10-13 NOTE — PROGRESS NOTES
[facility]   Obstetrics and Gynecology     10/13/2023    Patient: Micaela Ariza          MR#:[id number]    History of Present Illness    Chief Complaint   Patient presents with    Gynecologic Exam     Annual exam, last pap 22 (-), no complaints       28 y.o. female  who presents for annual exam. Has had a mirena for a few years  Has a bm every 3 days this is normal for her   She has noticed some new cramps  Was seen last week for bleeding this has since stopped she had an US and swab that was normal  Feels like this pain is similar to an ovarian cyst     Relevant data reviewed:    No LMP recorded (lmp unknown). Patient has had an implant.  Obstetric History:  OB History          1    Para   1    Term   1            AB        Living   1         SAB        IAB        Ectopic        Molar        Multiple   0    Live Births   1          Obstetric Comments   2019 8w0d Dating US:  Estimated Date of Delivery: 19 based on US kb  2019 19w1d normal and completed anatomic survey, male fetus, CL=3.61 cm  hb                     Menstrual History:     No LMP recorded (lmp unknown). Patient has had an implant.       Social History     Substance and Sexual Activity   Sexual Activity Yes    Partners: Male    Birth control/protection: I.U.D.    Comment: mirena 21     ______________________________________  Patient Active Problem List   Diagnosis    Slow transit constipation    Helicobacter pylori ab+    Herpes genitalis    Herpes simplex virus type 2 (HSV-2) infection affecting pregnancy in third trimester    Well female exam with routine gynecological exam    Screening for human papillomavirus (HPV)    Screening for malignant neoplasm of cervix    Presence of IUD    Pelvic pain     Past Medical History:   Diagnosis Date    Abnormal Pap smear of cervix     2 yrs ago- no colpo.    Allergic rhinitis     Headache     Helicobacter pylori ab+     Herpes genitalia     rare  "outbreak- 1 x/yr, 2018 last    Herpes genitalis     Injury of neck     Kidney stone     Malocclusion     Ovarian cyst     STD (female)     Varicella      Past Surgical History:   Procedure Laterality Date    MOUTH SURGERY      WISDOM TOOTH EXTRACTION       Social History     Tobacco Use   Smoking Status Never   Smokeless Tobacco Never     Family History   Problem Relation Age of Onset    Hypertension Mother     Hypertension Father     Heart disease Paternal Grandmother     Deep vein thrombosis Paternal Grandmother     Breast cancer Neg Hx     Ovarian cancer Neg Hx     Uterine cancer Neg Hx     Colon cancer Neg Hx     Prostate cancer Neg Hx     Melanoma Neg Hx     Pulmonary embolism Neg Hx      Prior to Admission medications    Medication Sig Start Date End Date Taking? Authorizing Provider   Levonorgestrel (Mirena, 52 MG,) 20 MCG/DAY intrauterine device IUD 1 each by Intrauterine route.   Yes Provider, MD Tino   valACYclovir (VALTREX) 500 MG tablet TAKE 1 TABLET BY MOUTH EVERY DAY 8/7/23  Yes Torie Del Real PA     _______________________________________    Current contraception: IUD  History of abnormal Pap smear: yes - hx normal now  Family history of uterine or ovarian cancer: no  Family History of colon cancer/colon polyps: no  Family History of Breast Cancer:no  History of abnormal mammogram: no  History of abnormal lipids: no    The following portions of the patient's history were reviewed and updated as appropriate: allergies, current medications, past family history, past medical history, past social history, past surgical history, and problem list.    Review of Systems    Pertinent items are noted in HPI.       Objective   Physical Exam    /72   Ht 152.4 cm (60\")   Wt 54.9 kg (121 lb)   LMP  (LMP Unknown)   BMI 23.63 kg/mý    BP Readings from Last 3 Encounters:   10/13/23 110/72   10/05/23 108/70   06/28/22 120/72      Wt Readings from Last 3 Encounters:   10/13/23 54.9 kg (121 lb) " "  10/05/23 54.9 kg (121 lb)   22 57.2 kg (126 lb)        BMI: Estimated body mass index is 23.63 kg/mý as calculated from the following:    Height as of this encounter: 152.4 cm (60\").    Weight as of this encounter: 54.9 kg (121 lb).       General: alert, appears stated age, and cooperative   Heart: regular rate and rhythm, S1, S2 normal, no murmur, click, rub or gallop   Lungs: clear to auscultation bilaterally   Abdomen: soft, non-tender, without masses or organomegaly and soft, non-tender, without masses, no organomegaly   Breast: inspection negative, no nipple discharge or bleeding, no masses or nodularity palpable   External genitalia/Vulva: External genitalia including bartholin's glands, Urethra, [place]'s gland and urethra meatus are normal, Perineum, rectum and anus appear normal , and Bladder appears normal without significant prolapse    Vagina: normal mucosa, normal discharge   Cervix: no lesions and IUD strings are visible   Uterus: normal size and non-tender   Adnexa: normal adnexa     As part of wellness and prevention, the following topics were discussed with the patient:  Encouraged self breast exam  Physical activity and regular exercised encouraged.         Problem List   Meds  History  Prep for Surg   Imagin}    Assessment:  Diagnoses and all orders for this visit:    1. Well female exam with routine gynecological exam (Primary)  -     IGP, Apt HPV,rfx 16 / 18,45    2. Screening for human papillomavirus (HPV)  -     IGP, Apt HPV,rfx 16 / 18,45    3. Screening for malignant neoplasm of cervix  -     IGP, Apt HPV,rfx 16 / 18,45    4. Presence of IUD    5. Pelvic pain      Plan:pap today will return if pain continues encouraged motrin for pain relief keep track of pain we discussed likely ovulation pain All of the patient's questions were addressed and answered, I have encouraged her to call for today's test results if she has not received them within 10 days.  Patient is advised to " call with any change in her condition or with any other questions, otherwise return in 12 months for annual examination. Encouraged vitamin D 800mg supplement and 1200mg calcium supplement over the counter incorporate 150 minutes of aerobic exercise weekly with strength training  Return in 1 year (on 10/13/2024) for Annual exam.    Rosa Lopez, FOREIGN  10/13/2023 13:57 EDT

## 2023-10-17 LAB
CYTOLOGIST CVX/VAG CYTO: NORMAL
CYTOLOGY CVX/VAG DOC CYTO: NORMAL
CYTOLOGY CVX/VAG DOC THIN PREP: NORMAL
DX ICD CODE: NORMAL
HIV 1 & 2 AB SER-IMP: NORMAL
HPV I/H RISK 4 DNA CVX QL PROBE+SIG AMP: NEGATIVE
OTHER STN SPEC: NORMAL
STAT OF ADQ CVX/VAG CYTO-IMP: NORMAL

## 2023-11-06 RX ORDER — VALACYCLOVIR HYDROCHLORIDE 500 MG/1
TABLET, FILM COATED ORAL
Qty: 90 TABLET | Refills: 3 | Status: SHIPPED | OUTPATIENT
Start: 2023-11-06

## 2024-10-15 ENCOUNTER — OFFICE VISIT (OUTPATIENT)
Dept: OBSTETRICS AND GYNECOLOGY | Age: 29
End: 2024-10-15
Payer: COMMERCIAL

## 2024-10-15 VITALS
DIASTOLIC BLOOD PRESSURE: 82 MMHG | SYSTOLIC BLOOD PRESSURE: 136 MMHG | BODY MASS INDEX: 24.35 KG/M2 | HEIGHT: 60 IN | WEIGHT: 124 LBS

## 2024-10-15 DIAGNOSIS — F43.9 STRESS: ICD-10-CM

## 2024-10-15 DIAGNOSIS — Z13.89 SCREENING FOR BLOOD OR PROTEIN IN URINE: ICD-10-CM

## 2024-10-15 DIAGNOSIS — R35.0 URINARY FREQUENCY: ICD-10-CM

## 2024-10-15 DIAGNOSIS — Z12.4 SCREENING FOR MALIGNANT NEOPLASM OF CERVIX: ICD-10-CM

## 2024-10-15 DIAGNOSIS — Z11.51 SCREENING FOR HUMAN PAPILLOMAVIRUS (HPV): ICD-10-CM

## 2024-10-15 DIAGNOSIS — N32.81 OVERACTIVE BLADDER: ICD-10-CM

## 2024-10-15 DIAGNOSIS — Z01.419 WELL FEMALE EXAM WITH ROUTINE GYNECOLOGICAL EXAM: Primary | ICD-10-CM

## 2024-10-15 DIAGNOSIS — Z97.5 PRESENCE OF IUD: ICD-10-CM

## 2024-10-15 DIAGNOSIS — N30.00 ACUTE CYSTITIS WITHOUT HEMATURIA: ICD-10-CM

## 2024-10-15 LAB
BILIRUB BLD-MCNC: NEGATIVE MG/DL
GLUCOSE UR STRIP-MCNC: NEGATIVE MG/DL
KETONES UR QL: NEGATIVE
LEUKOCYTE EST, POC: ABNORMAL
NITRITE UR-MCNC: POSITIVE MG/ML
PH UR: 6 [PH] (ref 5–8)
PROT UR STRIP-MCNC: NEGATIVE MG/DL
RBC # UR STRIP: NEGATIVE /UL
SP GR UR: 1.02 (ref 1–1.03)
UROBILINOGEN UR QL: ABNORMAL

## 2024-10-15 RX ORDER — NITROFURANTOIN 25; 75 MG/1; MG/1
100 CAPSULE ORAL 2 TIMES DAILY
Qty: 10 CAPSULE | Refills: 0 | Status: SHIPPED | OUTPATIENT
Start: 2024-10-15 | End: 2024-10-20

## 2024-10-15 RX ORDER — HYDROXYZINE HYDROCHLORIDE 25 MG/1
25 TABLET, FILM COATED ORAL 3 TIMES DAILY PRN
COMMUNITY

## 2024-10-15 RX ORDER — ESCITALOPRAM OXALATE 20 MG/1
20 TABLET ORAL DAILY
COMMUNITY

## 2024-10-15 RX ORDER — AMLODIPINE BESYLATE 5 MG/1
5 TABLET ORAL DAILY
COMMUNITY

## 2024-10-15 NOTE — PROGRESS NOTES
Subjective     Chief Complaint   Patient presents with    Gynecologic Exam     Ae- last pap 10/13/2023 Neg, Hpv Neg       History of Present Illness    Micaela Ariza is a 29 y.o.  who presents for annual exam.  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 Patient has had an implant.         Current contraception: IUD  History of abnormal Pap smear: yes - hx normal since  Received Gardasil immunization: yes  Perform regular self breast exam: no  Family history of uterine or ovarian cancer: no  Family History of colon cancer: no  Family history of breast cancer: no    Mammogram: not indicated.  Colonoscopy: not indicated.  DEXA: not indicated.    Exercise: moderately active  Calcium/Vitamin D: inadequate intake    The following portions of the patient's history were reviewed and updated as appropriate: allergies, current medications, past family history, past medical history, past social history, past surgical history, and problem list.    Review of Systems   Constitutional: Negative.    HENT: Negative.     Eyes: Negative.    Respiratory: Negative.     Cardiovascular: Negative.    Gastrointestinal: Negative.    Endocrine: Negative.    Genitourinary: Negative.    Musculoskeletal: Negative.    Skin: Negative.    Allergic/Immunologic:  "Negative.    Neurological: Negative.    Hematological: Negative.    Psychiatric/Behavioral: Negative.             Objective   Physical Exam  Vitals and nursing note reviewed.   Constitutional:       Appearance: Normal appearance.   HENT:      Head: Normocephalic.   Eyes:      Pupils: Pupils are equal, round, and reactive to light.   Cardiovascular:      Rate and Rhythm: Normal rate and regular rhythm.      Pulses: Normal pulses.      Heart sounds: Normal heart sounds.   Pulmonary:      Effort: Pulmonary effort is normal.      Breath sounds: Normal breath sounds.   Chest:   Breasts:     Right: Normal.      Left: Normal.   Abdominal:      General: Abdomen is flat. Bowel sounds are normal.      Palpations: Abdomen is soft.   Genitourinary:     General: Normal vulva.      Exam position: Lithotomy position.      Vagina: Normal.      Cervix: Normal.      Uterus: Normal.       Adnexa: Right adnexa normal and left adnexa normal.      Rectum: Normal.      Comments: Iud strings per os  Musculoskeletal:         General: Normal range of motion.      Cervical back: Full passive range of motion without pain and normal range of motion.      Right lower leg: No edema.      Left lower leg: No edema.   Lymphadenopathy:      Head:      Right side of head: No submental or submandibular adenopathy.      Left side of head: No submental or submandibular adenopathy.   Skin:     General: Skin is warm and dry.   Neurological:      General: No focal deficit present.      Mental Status: She is alert.      Gait: Gait is intact.   Psychiatric:         Attention and Perception: Attention normal.         Mood and Affect: Mood normal.         Speech: Speech normal.         /82   Ht 152.4 cm (60\")   Wt 56.2 kg (124 lb)   BMI 24.22 kg/m²     Assessment & Plan   Diagnoses and all orders for this visit:    1. Well female exam with routine gynecological exam (Primary)  -     IGP, Apt HPV,rfx 16 / 18,45    2. Screening for human papillomavirus " (HPV)  -     IGP, Apt HPV,rfx 16 / 18,45    3. Screening for malignant neoplasm of cervix  -     IGP, Apt HPV,rfx 16 / 18,45    4. Overactive bladder    5. Presence of IUD    6. Stress    7. Acute cystitis without hematuria  -     nitrofurantoin, macrocrystal-monohydrate, (Macrobid) 100 MG capsule; Take 1 capsule by mouth 2 (Two) Times a Day for 5 days.  Dispense: 10 capsule; Refill: 0    8. Urinary frequency      Pap today guidelines   UA today + for UTI treated today  Will send UC  Likely reasoning for most of her sx however  Discussed oab can try a medication if she desires will wait for now  Reduce caffeine and try to go every few hours bladder train  Start magnesium glycinate for stress and exercise   All questions answered and addressed  Breast self exam technique reviewed and patient encouraged to perform self-exam monthly.  Discussed healthy lifestyle modifications.  Recommended 30 minutes of aerobic exercise five times per week.  Encouraged multivitamin use with vit d  Call the office if you have not received results from today's visit within 2 weeks               Rosa Lopez, FOREIGN  10/15/2024, 15:52 EDT

## 2024-10-18 LAB
BACTERIA UR CULT: ABNORMAL
BACTERIA UR CULT: ABNORMAL
OTHER ANTIBIOTIC SUSC ISLT: ABNORMAL

## 2024-10-21 DIAGNOSIS — B37.9 YEAST INFECTION: Primary | ICD-10-CM

## 2024-10-21 LAB
CYTOLOGIST CVX/VAG CYTO: NORMAL
CYTOLOGY CVX/VAG DOC CYTO: NORMAL
CYTOLOGY CVX/VAG DOC THIN PREP: NORMAL
DX ICD CODE: NORMAL
HPV I/H RISK 4 DNA CVX QL PROBE+SIG AMP: NEGATIVE
Lab: NORMAL
OTHER STN SPEC: NORMAL
STAT OF ADQ CVX/VAG CYTO-IMP: NORMAL

## 2024-10-21 RX ORDER — FLUCONAZOLE 150 MG/1
150 TABLET ORAL DAILY
Qty: 2 TABLET | Refills: 0 | Status: SHIPPED | OUTPATIENT
Start: 2024-10-21

## 2025-06-30 ENCOUNTER — OFFICE VISIT (OUTPATIENT)
Dept: OBSTETRICS AND GYNECOLOGY | Age: 30
End: 2025-06-30
Payer: COMMERCIAL

## 2025-06-30 VITALS
BODY MASS INDEX: 24.94 KG/M2 | DIASTOLIC BLOOD PRESSURE: 80 MMHG | WEIGHT: 127 LBS | SYSTOLIC BLOOD PRESSURE: 128 MMHG | HEIGHT: 60 IN

## 2025-06-30 DIAGNOSIS — Z97.5 IUD (INTRAUTERINE DEVICE) IN PLACE: ICD-10-CM

## 2025-06-30 DIAGNOSIS — Z13.89 SCREENING FOR BLOOD OR PROTEIN IN URINE: Primary | ICD-10-CM

## 2025-06-30 DIAGNOSIS — Z11.3 SCREEN FOR STD (SEXUALLY TRANSMITTED DISEASE): ICD-10-CM

## 2025-06-30 DIAGNOSIS — R10.2 PELVIC PAIN: ICD-10-CM

## 2025-06-30 DIAGNOSIS — N89.8 VAGINAL DISCHARGE: ICD-10-CM

## 2025-06-30 LAB
B-HCG UR QL: NEGATIVE
BILIRUB BLD-MCNC: NEGATIVE MG/DL
CLARITY, POC: CLEAR
COLOR UR: YELLOW
EXPIRATION DATE: NORMAL
GLUCOSE UR STRIP-MCNC: NEGATIVE MG/DL
INTERNAL NEGATIVE CONTROL: NORMAL
INTERNAL POSITIVE CONTROL: NORMAL
KETONES UR QL: NEGATIVE
LEUKOCYTE EST, POC: NEGATIVE
Lab: NORMAL
NITRITE UR-MCNC: NEGATIVE MG/ML
PH UR: 7 [PH] (ref 5–8)
PROT UR STRIP-MCNC: NEGATIVE MG/DL
RBC # UR STRIP: NEGATIVE /UL
SP GR UR: 1.01 (ref 1–1.03)
UROBILINOGEN UR QL: NORMAL

## 2025-06-30 NOTE — PROGRESS NOTES
"Subjective     Chief Complaint   Patient presents with    Abdominal Pain     C/o abdominal pain and discharge.       Micaela Ariza is a 30 y.o.  whose LMP is No LMP recorded. Patient has had an implant. presents with pain and discharge    She is noting some pain and d/c  Onset of sxs 2 wks ago  Notes discomfort on the right side primarily  No nausea of  vomiting  No fever  Does have her appendix    Notes some bowel changes, looser  Thought it was diet related    Denies bladder issues  H/o uti when seen last year for her annual exam    She has the iud in place  Present for 4 years   Has been happy with it and doesn't have menses    She denies risk for std but is open to testing    In a monogamous relationship and partner had vasectomy so risk of pregnancy is low  Agreeable to upt today    Pt of Dr manning    No Additional Complaints Reported    The following portions of the patient's history were reviewed and updated as appropriate:no additional history reviewed, vital signs, allergies, current medications, past medical history, past social history, past surgical history, and problem list      Review of Systems   Genitourinary:positive for pain and d/c      Objective      /80   Ht 152.4 cm (60\")   Wt 57.6 kg (127 lb)   BMI 24.80 kg/m²     Physical Exam    General:   alert, comfortable, and no distress   Heart: Not performed today   Lungs: Not performed today.   Breast: Not performed today   Neck: Not performed today   Abdomen: Not performed today   CVA: Not performed today   Pelvis: External genitalia: normal general appearance  Vaginal: normal mucosa without prolapse or lesions and discharge, white  Cervix: normal appearance, GC prep obtained, and IUD string visualized  Adnexa: normal bimanual exam  Uterus: normal single, nontender   Extremities: Not performed today   Neurologic: negative   Psychiatric: Normal affect, judgement, and mood       Lab Review   Labs: Urine pregnancy test, Urinalysis - " with micro    Imaging   No data reviewed    Assessment & Plan     ASSESSMENT  1. Screening for blood or protein in urine    2. IUD (intrauterine device) in place    3. Vaginal discharge    4. Screen for STD (sexually transmitted disease)    5. Pelvic pain          PLAN  1.   Orders Placed This Encounter   Procedures    NuSwab VG+ - Swab, Vagina    Urine Culture - Urine, Urine, Random Void    POC Urinalysis Dipstick, Multipro    POC Pregnancy, Urine       2. Nuswab obtained. Send urine culture as well. Work on diet and push fluids. Will plan pelvic u/s as well when able to get in for one.   Disc ER if pain worsens or is accompanied by nausea/vomiting and/or fever.   Could consider early removal and replacement of iud if all wnl and spotting/d/c persists    Follow up: 4 day(s)    AL Wahl  6/30/2025

## 2025-07-02 LAB
A VAGINAE DNA VAG QL NAA+PROBE: ABNORMAL SCORE
BACTERIA UR CULT: NO GROWTH
BACTERIA UR CULT: NORMAL
BVAB2 DNA VAG QL NAA+PROBE: ABNORMAL SCORE
C ALBICANS DNA VAG QL NAA+PROBE: POSITIVE
C GLABRATA DNA VAG QL NAA+PROBE: NEGATIVE
C TRACH DNA SPEC QL NAA+PROBE: NEGATIVE
MEGA1 DNA VAG QL NAA+PROBE: ABNORMAL SCORE
N GONORRHOEA DNA VAG QL NAA+PROBE: NEGATIVE
T VAGINALIS DNA VAG QL NAA+PROBE: NEGATIVE

## 2025-08-28 ENCOUNTER — OFFICE VISIT (OUTPATIENT)
Dept: OBSTETRICS AND GYNECOLOGY | Age: 30
End: 2025-08-28
Payer: COMMERCIAL

## 2025-08-28 VITALS
BODY MASS INDEX: 24.74 KG/M2 | WEIGHT: 126 LBS | HEIGHT: 60 IN | DIASTOLIC BLOOD PRESSURE: 84 MMHG | SYSTOLIC BLOOD PRESSURE: 132 MMHG

## 2025-08-28 DIAGNOSIS — Z30.432 ENCOUNTER FOR IUD REMOVAL: ICD-10-CM

## 2025-08-28 DIAGNOSIS — Z30.430 ENCOUNTER FOR INSERTION OF MIRENA IUD: Primary | ICD-10-CM
